# Patient Record
Sex: FEMALE | Race: WHITE | NOT HISPANIC OR LATINO | Employment: OTHER
[De-identification: names, ages, dates, MRNs, and addresses within clinical notes are randomized per-mention and may not be internally consistent; named-entity substitution may affect disease eponyms.]

---

## 2017-01-06 ENCOUNTER — HOSPITAL (OUTPATIENT)
Dept: OTHER | Age: 82
End: 2017-01-06
Attending: INTERNAL MEDICINE

## 2017-01-06 LAB
FREE T3: 2.5 PG/ML (ref 2.2–4)
FREE T4: 1.1 NG/DL (ref 0.8–1.5)
TSH SERPL-ACNC: 2.31 MCUNIT/ML (ref 0.35–5)

## 2017-02-01 ENCOUNTER — HOSPITAL (OUTPATIENT)
Dept: OTHER | Age: 82
End: 2017-02-01
Attending: INTERNAL MEDICINE

## 2017-02-27 RX ORDER — CALCIPOTRIENE 50 UG/G
1 OINTMENT TOPICAL 2 TIMES DAILY
Qty: 900 G | Refills: 1 | Status: SHIPPED
Start: 2017-02-27 | End: 2018-04-21

## 2017-02-28 ENCOUNTER — TELEPHONE (OUTPATIENT)
Dept: DERMATOLOGY CLINIC | Facility: CLINIC | Age: 82
End: 2017-02-28

## 2017-03-03 NOTE — TELEPHONE ENCOUNTER
PA for Calcipotriene ointment was denied. Letter states cream and solution are covered. Ok to change? Kindly advise.

## 2017-03-30 ENCOUNTER — TELEPHONE (OUTPATIENT)
Dept: DERMATOLOGY CLINIC | Facility: CLINIC | Age: 82
End: 2017-03-30

## 2017-03-31 RX ORDER — CALCIPOTRIENE 50 UG/G
CREAM TOPICAL
Qty: 900 G | Refills: 3 | Status: SHIPPED | OUTPATIENT
Start: 2017-03-31 | End: 2018-03-21

## 2017-04-13 ENCOUNTER — HOSPITAL (OUTPATIENT)
Dept: OTHER | Age: 82
End: 2017-04-13
Attending: INTERNAL MEDICINE

## 2017-04-13 LAB
FREE T3: 2.4 PG/ML (ref 2.2–4)
FREE T4: 1.2 NG/DL (ref 0.8–1.5)
TSH SERPL-ACNC: 0.92 MCUNIT/ML (ref 0.35–5)

## 2017-04-25 ENCOUNTER — HOSPITAL (OUTPATIENT)
Dept: OTHER | Age: 82
End: 2017-04-25
Attending: SPECIALIST

## 2017-04-25 LAB
ALBUMIN SERPL-MCNC: 3.7 GM/DL (ref 3.6–5.1)
ALBUMIN/GLOB SERPL: 1.1 {RATIO} (ref 1–2.4)
ALP SERPL-CCNC: 112 UNIT/L (ref 45–117)
ALT SERPL-CCNC: 26 UNIT/L
ANION GAP SERPL CALC-SCNC: 12 MMOL/L (ref 10–20)
AST SERPL-CCNC: 19 UNIT/L
BILIRUB SERPL-MCNC: 0.6 MG/DL (ref 0.2–1)
BUN SERPL-MCNC: 17 MG/DL (ref 6–20)
BUN/CREAT SERPL: 21 (ref 7–25)
CALCIUM SERPL-MCNC: 9.2 MG/DL (ref 8.4–10.2)
CHLORIDE: 100 MMOL/L (ref 98–107)
CHOLEST SERPL-MCNC: 125 MG/DL
CHOLEST/HDLC SERPL: 2.2 {RATIO}
CO2 SERPL-SCNC: 25 MMOL/L (ref 21–32)
CREAT SERPL-MCNC: 0.8 MG/DL (ref 0.51–0.95)
FREE T4: 1.2 NG/DL (ref 0.8–1.5)
GLOBULIN SER-MCNC: 3.4 GM/DL (ref 2–4)
GLUCOSE SERPL-MCNC: 108 MG/DL (ref 65–99)
HDLC SERPL-MCNC: 57 MG/DL
LDLC SERPL CALC-MCNC: 53 MG/DL
LENGTH OF FAST TIME PATIENT: ABNORMAL HR
LENGTH OF FAST TIME PATIENT: ABNORMAL HR
NONHDLC SERPL-MCNC: 68 MG/DL
POTASSIUM SERPL-SCNC: 4.2 MMOL/L (ref 3.4–5.1)
PROT SERPL-MCNC: 7.1 GM/DL (ref 6.4–8.2)
SODIUM SERPL-SCNC: 133 MMOL/L (ref 135–145)
TRIGLYCERIDE (TRIGP): 76 MG/DL
TSH SERPL-ACNC: 0.86 MCUNIT/ML (ref 0.35–5)

## 2017-05-04 ENCOUNTER — PRIOR ORIGINAL RECORDS (OUTPATIENT)
Dept: OTHER | Age: 82
End: 2017-05-04

## 2017-05-10 ENCOUNTER — HOSPITAL (OUTPATIENT)
Dept: OTHER | Age: 82
End: 2017-05-10
Attending: SPECIALIST

## 2017-05-19 ENCOUNTER — TELEPHONE (OUTPATIENT)
Dept: DERMATOLOGY CLINIC | Facility: CLINIC | Age: 82
End: 2017-05-19

## 2017-06-06 NOTE — TELEPHONE ENCOUNTER
LMTCB. Please follow up about if pt would still like forms filled out. Also, please schedule pt for skin check appointment.  LOV 6/2016

## 2017-06-06 NOTE — TELEPHONE ENCOUNTER
Form scanned previously dated 7-1-2015 in scanning. I have not seen any paperwork comes through for this. If necessary reprint 7/20/2015 form/add updates, re-sign and fax if needed. . Patient due for her skin check in the next month or 2
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
## 2017-06-22 ENCOUNTER — TELEPHONE (OUTPATIENT)
Dept: DERMATOLOGY CLINIC | Facility: CLINIC | Age: 82
End: 2017-06-22

## 2017-08-11 ENCOUNTER — HOSPITAL (OUTPATIENT)
Dept: OTHER | Age: 82
End: 2017-08-11
Attending: INTERNAL MEDICINE

## 2017-08-11 LAB
FREE T3: 2.7 PG/ML (ref 2.2–4)
FREE T4: 1 NG/DL (ref 0.8–1.5)
TSH SERPL-ACNC: 0.81 MCUNIT/ML (ref 0.35–5)

## 2017-09-12 ENCOUNTER — HOSPITAL (OUTPATIENT)
Dept: OTHER | Age: 82
End: 2017-09-12
Attending: INTERNAL MEDICINE

## 2017-09-12 LAB
FREE T3: 2.7 PG/ML (ref 2.2–4)
FREE T4: 1.1 NG/DL (ref 0.8–1.5)
TSH SERPL-ACNC: 0.85 MCUNIT/ML (ref 0.35–5)

## 2017-10-30 ENCOUNTER — PRIOR ORIGINAL RECORDS (OUTPATIENT)
Dept: OTHER | Age: 82
End: 2017-10-30

## 2017-12-13 ENCOUNTER — HOSPITAL (OUTPATIENT)
Dept: OTHER | Age: 82
End: 2017-12-13
Attending: INTERNAL MEDICINE

## 2017-12-13 LAB
FREE T3: 2.8 PG/ML (ref 2.2–4)
FREE T4: 1.2 NG/DL (ref 0.8–1.5)
TSH SERPL-ACNC: 0.7 MCUNIT/ML (ref 0.35–5)

## 2018-02-19 ENCOUNTER — HOSPITAL (OUTPATIENT)
Dept: OTHER | Age: 83
End: 2018-02-19
Attending: SPECIALIST

## 2018-02-19 LAB
ALBUMIN SERPL-MCNC: 3.8 GM/DL (ref 3.6–5.1)
ALBUMIN/GLOB SERPL: 1 {RATIO} (ref 1–2.4)
ALP SERPL-CCNC: 115 UNIT/L (ref 45–117)
ALT SERPL-CCNC: 31 UNIT/L
ANALYZER ANC (IANC): ABNORMAL THOUSAND/MCL (ref 140–450)
ANION GAP SERPL CALC-SCNC: 12 MMOL/L (ref 10–20)
AST SERPL-CCNC: 21 UNIT/L
BILIRUB SERPL-MCNC: 0.6 MG/DL (ref 0.2–1)
BUN SERPL-MCNC: 16 MG/DL (ref 6–20)
BUN/CREAT SERPL: 25 (ref 7–25)
CALCIUM SERPL-MCNC: 9.7 MG/DL (ref 8.4–10.2)
CHLORIDE: 99 MMOL/L (ref 98–107)
CHOLEST SERPL-MCNC: 176 MG/DL
CHOLEST/HDLC SERPL: 2.9 {RATIO}
CO2 SERPL-SCNC: 28 MMOL/L (ref 21–32)
CREAT SERPL-MCNC: 0.64 MG/DL (ref 0.51–0.95)
ERYTHROCYTE [DISTWIDTH] IN BLOOD: 13.2 % (ref 11–15)
FREE T4: 1 NG/DL (ref 0.8–1.5)
GLOBULIN SER-MCNC: 3.8 GM/DL (ref 2–4)
GLUCOSE SERPL-MCNC: 114 MG/DL (ref 65–99)
HDLC SERPL-MCNC: 61 MG/DL
HEMATOCRIT: 40 % (ref 36–46.5)
HGB BLD-MCNC: 13.7 GM/DL (ref 12–15.5)
LDLC SERPL CALC-MCNC: 95 MG/DL
LENGTH OF FAST TIME PATIENT: ABNORMAL HR
LENGTH OF FAST TIME PATIENT: NORMAL HR
MAGNESIUM SERPL-MCNC: 1.8 MG/DL (ref 1.7–2.4)
MCH RBC QN AUTO: 30.6 PG (ref 26–34)
MCHC RBC AUTO-ENTMCNC: 34.3 GM/DL (ref 32–36.5)
MCV RBC AUTO: 89.5 FL (ref 78–100)
NONHDLC SERPL-MCNC: 115 MG/DL
PLATELET # BLD: 111 THOUSAND/MCL (ref 140–450)
POTASSIUM SERPL-SCNC: 4 MMOL/L (ref 3.4–5.1)
PROT SERPL-MCNC: 7.6 GM/DL (ref 6.4–8.2)
RBC # BLD: 4.47 MILLION/MCL (ref 4–5.2)
SODIUM SERPL-SCNC: 135 MMOL/L (ref 135–145)
TRIGLYCERIDE (TRIGP): 100 MG/DL
TSH SERPL-ACNC: 1.52 MCUNIT/ML (ref 0.35–5)
WBC # BLD: 7 THOUSAND/MCL (ref 4.2–11)

## 2018-02-28 ENCOUNTER — HOSPITAL (OUTPATIENT)
Dept: OTHER | Age: 83
End: 2018-02-28
Attending: INTERNAL MEDICINE

## 2018-02-28 LAB
FREE T3: 2.6 PG/ML (ref 2.2–4)
FREE T4: 1.1 NG/DL (ref 0.8–1.5)
TSH SERPL-ACNC: 1.35 MCUNIT/ML (ref 0.35–5)

## 2018-03-21 RX ORDER — CALCIPOTRIENE 50 UG/G
CREAM TOPICAL
Qty: 900 G | Refills: 1 | Status: SHIPPED | OUTPATIENT
Start: 2018-03-21

## 2018-04-23 RX ORDER — CALCIPOTRIENE 50 UG/G
OINTMENT TOPICAL
Qty: 300 G | Refills: 0 | Status: SHIPPED | OUTPATIENT
Start: 2018-04-23

## 2018-05-01 ENCOUNTER — HOSPITAL (OUTPATIENT)
Dept: OTHER | Age: 83
End: 2018-05-01
Attending: INTERNAL MEDICINE

## 2018-05-01 LAB
FREE T3: 2.9 PG/ML (ref 2.2–4)
FREE T4: 1.1 NG/DL (ref 0.8–1.5)
TSH SERPL-ACNC: 1.3 MCUNIT/ML (ref 0.35–5)

## 2018-05-09 ENCOUNTER — MYAURORA ACCOUNT LINK (OUTPATIENT)
Dept: OTHER | Age: 83
End: 2018-05-09

## 2018-05-09 ENCOUNTER — PRIOR ORIGINAL RECORDS (OUTPATIENT)
Dept: OTHER | Age: 83
End: 2018-05-09

## 2018-05-18 ENCOUNTER — OFFICE VISIT (OUTPATIENT)
Dept: DERMATOLOGY CLINIC | Facility: CLINIC | Age: 83
End: 2018-05-18

## 2018-05-18 DIAGNOSIS — L40.8 OTHER PSORIASIS: Primary | ICD-10-CM

## 2018-05-18 DIAGNOSIS — D23.9 BENIGN NEOPLASM OF SKIN, UNSPECIFIED LOCATION: ICD-10-CM

## 2018-05-18 DIAGNOSIS — L82.1 OTHER SEBORRHEIC KERATOSIS: ICD-10-CM

## 2018-05-18 PROCEDURE — 99212 OFFICE O/P EST SF 10 MIN: CPT | Performed by: DERMATOLOGY

## 2018-05-18 PROCEDURE — G0463 HOSPITAL OUTPT CLINIC VISIT: HCPCS | Performed by: DERMATOLOGY

## 2018-05-18 PROCEDURE — 99213 OFFICE O/P EST LOW 20 MIN: CPT | Performed by: DERMATOLOGY

## 2018-05-18 RX ORDER — LOSARTAN POTASSIUM AND HYDROCHLOROTHIAZIDE 12.5; 1 MG/1; MG/1
TABLET ORAL
Refills: 1 | COMMUNITY
Start: 2018-03-06

## 2018-05-28 NOTE — PROGRESS NOTES
Shanelle Pace is a 81year old female. HPI:     CC:  Patient presents with:  Psoriasis: LOV: 6-3-16.  Pt presents today for f/u pt notes right now its at [facility] currently using Calcitrene cream that pt notes is appyling twice daily that pt Multiple Vitamins-Minerals (PRESERVISION AREDS 2) Oral Cap Take  by mouth. Disp:  Rfl:    carvedilol (COREG) 6.25 MG Oral Tab  Disp:  Rfl:    Meclizine HCl (ANTIVERT) 25 MG Oral Tab Take 25 mg by mouth as needed.  Disp:  Rfl: 0   Ascorbic Acid (VITAMIN C) children: N/A     Occupational History  None on file     Social History Main Topics   Smoking status: Never Smoker    Smokeless tobacco: Never Used    Alcohol use No    Drug use: No    Sexual activity: Not on file     Other Topics Concern    Pt has a pacem Refills for this Visit:    No prescriptions requested or ordered in this encounter         Other psoriasis  (primary encounter diagnosis)  Other seborrheic keratosis  Benign neoplasm of skin, unspecified location    Benign keratoses  c forehead temples ysabel

## 2018-06-21 ENCOUNTER — HOSPITAL (OUTPATIENT)
Dept: OTHER | Age: 83
End: 2018-06-21
Attending: INTERNAL MEDICINE

## 2018-06-21 LAB
FREE T3: 2.8 PG/ML (ref 2.2–4)
FREE T4: 1.1 NG/DL (ref 0.8–1.5)
TSH SERPL-ACNC: 0.51 MCUNIT/ML (ref 0.35–5)

## 2018-08-31 ENCOUNTER — HOSPITAL (OUTPATIENT)
Dept: OTHER | Age: 83
End: 2018-08-31
Attending: SPECIALIST

## 2018-11-01 ENCOUNTER — MYAURORA ACCOUNT LINK (OUTPATIENT)
Dept: OTHER | Age: 83
End: 2018-11-01

## 2018-11-01 ENCOUNTER — PRIOR ORIGINAL RECORDS (OUTPATIENT)
Dept: OTHER | Age: 83
End: 2018-11-01

## 2018-12-11 ENCOUNTER — MYAURORA ACCOUNT LINK (OUTPATIENT)
Dept: OTHER | Age: 83
End: 2018-12-11

## 2019-02-11 ENCOUNTER — HOSPITAL (OUTPATIENT)
Dept: OTHER | Age: 84
End: 2019-02-11
Attending: INTERNAL MEDICINE

## 2019-02-11 ENCOUNTER — HOSPITAL (OUTPATIENT)
Dept: OTHER | Age: 84
End: 2019-02-11

## 2019-02-11 ENCOUNTER — DIAGNOSTIC TRANS (OUTPATIENT)
Dept: OTHER | Age: 84
End: 2019-02-11

## 2019-02-11 LAB
ALBUMIN SERPL-MCNC: 3.4 GM/DL (ref 3.6–5.1)
ALP SERPL-CCNC: 83 UNIT/L (ref 45–117)
ALT SERPL-CCNC: 45 UNIT/L
ANALYZER ANC (IANC): ABNORMAL
ANION GAP SERPL CALC-SCNC: 14 MMOL/L (ref 10–20)
APTT PPP: 26 SECONDS (ref 22–32)
APTT PPP: NORMAL S
AST SERPL-CCNC: 37 UNIT/L
BASOPHILS # BLD: 0.1 THOUSAND/MCL (ref 0–0.3)
BASOPHILS NFR BLD: 1 %
BILIRUB CONJ SERPL-MCNC: 0.2 MG/DL (ref 0–0.2)
BILIRUB SERPL-MCNC: 0.8 MG/DL (ref 0.2–1)
BUN SERPL-MCNC: 19 MG/DL (ref 6–20)
BUN/CREAT SERPL: 23 (ref 7–25)
CALCIUM SERPL-MCNC: 9.4 MG/DL (ref 8.4–10.2)
CHLORIDE: 98 MMOL/L (ref 98–107)
CO2 SERPL-SCNC: 23 MMOL/L (ref 21–32)
CREAT SERPL-MCNC: 0.84 MG/DL (ref 0.51–0.95)
D DIMER PPP FEU-MCNC: 0.96 MG/L FEU
DIFFERENTIAL METHOD BLD: ABNORMAL
EOSINOPHIL # BLD: 0.3 THOUSAND/MCL (ref 0.1–0.5)
EOSINOPHIL NFR BLD: 4 %
ERYTHROCYTE [DISTWIDTH] IN BLOOD: 13.4 % (ref 11–15)
GLUCOSE BLDC GLUCOMTR-MCNC: 146 MG/DL (ref 65–99)
GLUCOSE SERPL-MCNC: 122 MG/DL (ref 65–99)
HEMATOCRIT: 36.6 % (ref 36–46.5)
HGB BLD-MCNC: 12.5 GM/DL (ref 12–15.5)
INR PPP: 1.2
LYMPHOCYTES # BLD: 1.8 THOUSAND/MCL (ref 1–4)
LYMPHOCYTES NFR BLD: 24 %
MCH RBC QN AUTO: 30 PG (ref 26–34)
MCHC RBC AUTO-ENTMCNC: 34.2 GM/DL (ref 32–36.5)
MCV RBC AUTO: 87.8 FL (ref 78–100)
MONOCYTES # BLD: 0.3 THOUSAND/MCL (ref 0.3–0.9)
MONOCYTES NFR BLD: 4 %
NEUTROPHILS # BLD: 5 THOUSAND/MCL (ref 1.8–7.7)
NEUTS SEG NFR BLD: 67 %
NRBC (NRBCRE): 0 /100 WBC
NT-PROBNP SERPL-MCNC: 6736 PG/ML
OVALOCYTES (OVALO): ABNORMAL
PATH REV BLD -IMP: ABNORMAL
PLAT MORPH BLD: NORMAL
PLATELET # BLD: 81 THOUSAND/MCL (ref 140–450)
POTASSIUM SERPL-SCNC: 4 MMOL/L (ref 3.4–5.1)
PROT SERPL-MCNC: 6.3 GM/DL (ref 6.4–8.2)
PROTHROMBIN TIME: 12.1 SECONDS (ref 9.7–11.8)
PROTHROMBIN TIME: ABNORMAL
RBC # BLD: 4.17 MILLION/MCL (ref 4–5.2)
SODIUM SERPL-SCNC: 131 MMOL/L (ref 135–145)
TROPONIN I SERPL HS-MCNC: 0.03 NG/ML
TSH SERPL-ACNC: 0.96 MCUNIT/ML (ref 0.35–5)
WBC # BLD: 7.4 THOUSAND/MCL (ref 4.2–11)
WBC MORPH BLD: NORMAL

## 2019-02-12 ENCOUNTER — HOSPITAL (OUTPATIENT)
Dept: OTHER | Age: 84
End: 2019-02-12

## 2019-02-12 LAB
ANALYZER ANC (IANC): ABNORMAL
ANION GAP SERPL CALC-SCNC: 12 MMOL/L (ref 10–20)
BASOPHILS # BLD: 0.1 THOUSAND/MCL (ref 0–0.3)
BASOPHILS NFR BLD: 1 %
BUN SERPL-MCNC: 22 MG/DL (ref 6–20)
BUN/CREAT SERPL: 28 (ref 7–25)
CALCIUM SERPL-MCNC: 9.5 MG/DL (ref 8.4–10.2)
CHLORIDE: 99 MMOL/L (ref 98–107)
CHOLEST SERPL-MCNC: 143 MG/DL
CHOLEST/HDLC SERPL: 2.8 {RATIO}
CO2 SERPL-SCNC: 26 MMOL/L (ref 21–32)
CREAT SERPL-MCNC: 0.8 MG/DL (ref 0.51–0.95)
DIFFERENTIAL METHOD BLD: ABNORMAL
EOSINOPHIL # BLD: 0.1 THOUSAND/MCL (ref 0.1–0.5)
EOSINOPHIL NFR BLD: 1 %
ERYTHROCYTE [DISTWIDTH] IN BLOOD: 13.4 % (ref 11–15)
GLUCOSE BLDC GLUCOMTR-MCNC: 111 MG/DL (ref 65–99)
GLUCOSE BLDC GLUCOMTR-MCNC: 113 MG/DL (ref 65–99)
GLUCOSE SERPL-MCNC: 105 MG/DL (ref 65–99)
HDLC SERPL-MCNC: 52 MG/DL
HEMATOCRIT: 35.1 % (ref 36–46.5)
HGB BLD-MCNC: 12 GM/DL (ref 12–15.5)
LDLC SERPL CALC-MCNC: 81 MG/DL
LYMPHOCYTES # BLD: 2.2 THOUSAND/MCL (ref 1–4)
LYMPHOCYTES NFR BLD: 22 %
MAGNESIUM SERPL-MCNC: 1.4 MG/DL (ref 1.7–2.4)
MAGNESIUM SERPL-MCNC: 1.7 MG/DL (ref 1.7–2.4)
MCH RBC QN AUTO: 29.8 PG (ref 26–34)
MCHC RBC AUTO-ENTMCNC: 34.2 GM/DL (ref 32–36.5)
MCV RBC AUTO: 87.1 FL (ref 78–100)
MONOCYTES # BLD: 0.7 THOUSAND/MCL (ref 0.3–0.9)
MONOCYTES NFR BLD: 9 %
NEUTROPHILS # BLD: 4.9 THOUSAND/MCL (ref 1.8–7.7)
NEUTS SEG NFR BLD: 61 %
NONHDLC SERPL-MCNC: 91 MG/DL
NRBC (NRBCRE): 0 /100 WBC
PATH REV BLD -IMP: ABNORMAL
PATHOLOGIST NAME: NORMAL
PLATELET # BLD: 91 THOUSAND/MCL (ref 140–450)
POTASSIUM SERPL-SCNC: 3.1 MMOL/L (ref 3.4–5.1)
POTASSIUM SERPL-SCNC: 3.5 MMOL/L (ref 3.4–5.1)
RBC # BLD: 4.03 MILLION/MCL (ref 4–5.2)
SMEAR/PATHOLOGY EVALUATION: NORMAL
SODIUM SERPL-SCNC: 134 MMOL/L (ref 135–145)
TRIGLYCERIDE (TRIGP): 50 MG/DL
VARIANT LYMPHS NFR BLD: 6 % (ref 0–5)
WBC # BLD: 8 THOUSAND/MCL (ref 4.2–11)

## 2019-02-13 LAB
%HEPARIN INHIBITED (PF4INH): NORMAL
ANALYZER ANC (IANC): ABNORMAL
ANION GAP SERPL CALC-SCNC: 13 MMOL/L (ref 10–20)
BASOPHILS # BLD: 0 THOUSAND/MCL (ref 0–0.3)
BASOPHILS NFR BLD: 0 %
BUN SERPL-MCNC: 23 MG/DL (ref 6–20)
BUN/CREAT SERPL: 26 (ref 7–25)
CALCIUM SERPL-MCNC: 9 MG/DL (ref 8.4–10.2)
CHLORIDE: 97 MMOL/L (ref 98–107)
CLOSURE TME BLD-IMP: NORMAL
CLOSURE TME COLL+EPINEP BLD: 122 SECONDS (ref 70–160)
CO2 SERPL-SCNC: 25 MMOL/L (ref 21–32)
CREAT SERPL-MCNC: 0.9 MG/DL (ref 0.51–0.95)
DIFFERENTIAL METHOD BLD: ABNORMAL
EOSINOPHIL # BLD: 0.4 THOUSAND/MCL (ref 0.1–0.5)
EOSINOPHIL NFR BLD: 6 %
ERYTHROCYTE [DISTWIDTH] IN BLOOD: 13.2 % (ref 11–15)
GLUCOSE BLDC GLUCOMTR-MCNC: 132 MG/DL (ref 65–99)
GLUCOSE SERPL-MCNC: 102 MG/DL (ref 65–99)
HEMATOCRIT: 34.6 % (ref 36–46.5)
HGB BLD-MCNC: 11.9 GM/DL (ref 12–15.5)
IGG INTERPRETATION (PF4GI): NORMAL
LYMPHOCYTES # BLD: 2.6 THOUSAND/MCL (ref 1–4)
LYMPHOCYTES NFR BLD: 40 %
MAGNESIUM SERPL-MCNC: 1.9 MG/DL (ref 1.7–2.4)
MCH RBC QN AUTO: 30.1 PG (ref 26–34)
MCHC RBC AUTO-ENTMCNC: 34.4 GM/DL (ref 32–36.5)
MCV RBC AUTO: 87.4 FL (ref 78–100)
MONOCYTES # BLD: 0.2 THOUSAND/MCL (ref 0.3–0.9)
MONOCYTES NFR BLD: 3 %
NEUTROPHILS # BLD: 3 THOUSAND/MCL (ref 1.8–7.7)
NEUTS SEG NFR BLD: 49 %
NRBC (NRBCRE): 0 /100 WBC
PATH REV BLD -IMP: ABNORMAL
PF4 IGG OPTICAL DENSITY (PF4GOD): 0.07
PF4 IGG RESULT (PF4G1): NEGATIVE
PLAT MORPH BLD: NORMAL
PLATELET # BLD: 115 THOUSAND/MCL (ref 140–450)
POLYCHROMASIA (POLY): ABNORMAL
POTASSIUM SERPL-SCNC: 4 MMOL/L (ref 3.4–5.1)
RBC # BLD: 3.96 MILLION/MCL (ref 4–5.2)
SODIUM SERPL-SCNC: 131 MMOL/L (ref 135–145)
VARIANT LYMPHS NFR BLD: 2 % (ref 0–5)
WBC # BLD: 6.1 THOUSAND/MCL (ref 4.2–11)
WBC MORPH BLD: NORMAL

## 2019-02-18 ENCOUNTER — HOSPITAL (OUTPATIENT)
Dept: OTHER | Age: 84
End: 2019-02-18
Attending: EMERGENCY MEDICINE

## 2019-02-18 ENCOUNTER — DIAGNOSTIC TRANS (OUTPATIENT)
Dept: OTHER | Age: 84
End: 2019-02-18

## 2019-02-18 LAB
ANALYZER ANC (IANC): ABNORMAL
ANION GAP SERPL CALC-SCNC: 12 MMOL/L (ref 10–20)
BASOPHILS # BLD: 0 THOUSAND/MCL (ref 0–0.3)
BASOPHILS NFR BLD: 1 %
BUN SERPL-MCNC: 20 MG/DL (ref 6–20)
BUN/CREAT SERPL: 18 (ref 7–25)
CALCIUM SERPL-MCNC: 9.1 MG/DL (ref 8.4–10.2)
CHLORIDE: 95 MMOL/L (ref 98–107)
CO2 SERPL-SCNC: 27 MMOL/L (ref 21–32)
CREAT SERPL-MCNC: 1.1 MG/DL (ref 0.51–0.95)
D DIMER PPP FEU-MCNC: 0.69 MG/L FEU
DIFFERENTIAL METHOD BLD: ABNORMAL
DIGOXIN SERPL-MCNC: 1.6 NG/ML (ref 0.8–2.1)
EOSINOPHIL # BLD: 0.2 THOUSAND/MCL (ref 0.1–0.5)
EOSINOPHIL NFR BLD: 3 %
ERYTHROCYTE [DISTWIDTH] IN BLOOD: 12.9 % (ref 11–15)
GLUCOSE SERPL-MCNC: 124 MG/DL (ref 65–99)
HEMATOCRIT: 35.2 % (ref 36–46.5)
HGB BLD-MCNC: 11.9 GM/DL (ref 12–15.5)
IMM GRANULOCYTES # BLD AUTO: 0 THOUSAND/MCL (ref 0–0.2)
IMM GRANULOCYTES NFR BLD: 0 %
LYMPHOCYTES # BLD: 1.9 THOUSAND/MCL (ref 1–4)
LYMPHOCYTES NFR BLD: 29 %
MCH RBC QN AUTO: 29.8 PG (ref 26–34)
MCHC RBC AUTO-ENTMCNC: 33.8 GM/DL (ref 32–36.5)
MCV RBC AUTO: 88 FL (ref 78–100)
MONOCYTES # BLD: 0.4 THOUSAND/MCL (ref 0.3–0.9)
MONOCYTES NFR BLD: 6 %
NEUTROPHILS # BLD: 3.9 THOUSAND/MCL (ref 1.8–7.7)
NEUTROPHILS NFR BLD: 61 %
NEUTS SEG NFR BLD: ABNORMAL %
NRBC (NRBCRE): 0 /100 WBC
NT-PROBNP SERPL-MCNC: 3886 PG/ML
PLATELET # BLD: 123 THOUSAND/MCL (ref 140–450)
POTASSIUM SERPL-SCNC: 4.8 MMOL/L (ref 3.4–5.1)
RBC # BLD: 4 MILLION/MCL (ref 4–5.2)
SODIUM SERPL-SCNC: 129 MMOL/L (ref 135–145)
TROPONIN I SERPL HS-MCNC: <0.02 NG/ML
WBC # BLD: 6.4 THOUSAND/MCL (ref 4.2–11)

## 2019-02-28 VITALS
DIASTOLIC BLOOD PRESSURE: 105 MMHG | HEIGHT: 62 IN | SYSTOLIC BLOOD PRESSURE: 158 MMHG | BODY MASS INDEX: 29.81 KG/M2 | WEIGHT: 162 LBS | HEART RATE: 66 BPM

## 2019-02-28 VITALS — HEIGHT: 62 IN | HEART RATE: 70 BPM | DIASTOLIC BLOOD PRESSURE: 60 MMHG | SYSTOLIC BLOOD PRESSURE: 134 MMHG

## 2019-02-28 VITALS — SYSTOLIC BLOOD PRESSURE: 142 MMHG | HEART RATE: 66 BPM | DIASTOLIC BLOOD PRESSURE: 70 MMHG | HEIGHT: 62 IN

## 2019-03-01 VITALS
BODY MASS INDEX: 29.44 KG/M2 | WEIGHT: 160 LBS | HEIGHT: 62 IN | SYSTOLIC BLOOD PRESSURE: 124 MMHG | HEART RATE: 68 BPM | DIASTOLIC BLOOD PRESSURE: 64 MMHG

## 2019-03-06 ENCOUNTER — TELEPHONE (OUTPATIENT)
Dept: CARDIOLOGY | Age: 84
End: 2019-03-06

## 2019-03-15 ENCOUNTER — TELEPHONE (OUTPATIENT)
Dept: CARDIOLOGY | Age: 84
End: 2019-03-15

## 2019-04-04 ENCOUNTER — HOSPITAL (OUTPATIENT)
Dept: OTHER | Age: 84
End: 2019-04-04
Attending: EMERGENCY MEDICINE

## 2019-04-12 ENCOUNTER — HOSPITAL (OUTPATIENT)
Dept: OTHER | Age: 84
End: 2019-04-12
Attending: ORTHOPAEDIC SURGERY

## 2019-04-16 ENCOUNTER — HOSPITAL (OUTPATIENT)
Dept: OTHER | Age: 84
End: 2019-04-16
Attending: ORTHOPAEDIC SURGERY

## 2019-04-16 ENCOUNTER — HOSPITAL (OUTPATIENT)
Dept: OTHER | Age: 84
End: 2019-04-16

## 2019-04-18 RX ORDER — CETIRIZINE HYDROCHLORIDE 10 MG/1
TABLET ORAL
COMMUNITY
End: 2019-05-30 | Stop reason: CLARIF

## 2019-04-18 RX ORDER — METHIMAZOLE 5 MG/1
TABLET ORAL
COMMUNITY
End: 2019-05-30 | Stop reason: CLARIF

## 2019-04-18 RX ORDER — MV-MN/OM3/DHA/EPA/FISH/LUT/ZEA 250-5-1 MG
CAPSULE ORAL
COMMUNITY
End: 2019-05-30 | Stop reason: CLARIF

## 2019-04-18 RX ORDER — VITAMIN E 268 MG
CAPSULE ORAL
COMMUNITY
End: 2019-05-30 | Stop reason: CLARIF

## 2019-04-18 RX ORDER — CARVEDILOL 6.25 MG/1
TABLET ORAL
COMMUNITY
End: 2019-05-30 | Stop reason: DRUGHIGH

## 2019-04-18 RX ORDER — MULTIVIT WITH MINERALS/LUTEIN
TABLET ORAL
COMMUNITY
End: 2019-05-30 | Stop reason: CLARIF

## 2019-04-18 RX ORDER — MECLIZINE HYDROCHLORIDE 25 MG/1
TABLET ORAL
COMMUNITY
End: 2019-05-30 | Stop reason: CLARIF

## 2019-04-18 RX ORDER — CALCIUM CARBONATE 500(1250)
TABLET ORAL
COMMUNITY
End: 2019-05-30 | Stop reason: CLARIF

## 2019-04-18 RX ORDER — THIAMINE HCL 100 MG
TABLET ORAL
COMMUNITY
End: 2019-05-30 | Stop reason: CLARIF

## 2019-04-18 RX ORDER — METHIMAZOLE 5 MG/1
TABLET ORAL
COMMUNITY
End: 2019-05-30 | Stop reason: ALTCHOICE

## 2019-04-18 RX ORDER — LOSARTAN POTASSIUM AND HYDROCHLOROTHIAZIDE 25; 100 MG/1; MG/1
TABLET ORAL
COMMUNITY
End: 2019-05-30 | Stop reason: CLARIF

## 2019-05-21 ENCOUNTER — TELEPHONE (OUTPATIENT)
Dept: CARDIOLOGY | Age: 84
End: 2019-05-21

## 2019-05-30 ENCOUNTER — OFFICE VISIT (OUTPATIENT)
Dept: CARDIOLOGY | Age: 84
End: 2019-05-30

## 2019-05-30 ENCOUNTER — HOSPITAL (OUTPATIENT)
Dept: OTHER | Age: 84
End: 2019-05-30
Attending: INTERNAL MEDICINE

## 2019-05-30 ENCOUNTER — LAB SERVICES (OUTPATIENT)
Dept: LAB | Age: 84
End: 2019-05-30

## 2019-05-30 VITALS
HEART RATE: 64 BPM | WEIGHT: 142 LBS | DIASTOLIC BLOOD PRESSURE: 70 MMHG | SYSTOLIC BLOOD PRESSURE: 112 MMHG | BODY MASS INDEX: 25.97 KG/M2

## 2019-05-30 DIAGNOSIS — E78.2 HYPERLIPIDEMIA, MIXED: ICD-10-CM

## 2019-05-30 DIAGNOSIS — Z79.899 ENCOUNTER FOR MONITORING AMIODARONE THERAPY: ICD-10-CM

## 2019-05-30 DIAGNOSIS — Z51.81 ENCOUNTER FOR MONITORING AMIODARONE THERAPY: ICD-10-CM

## 2019-05-30 DIAGNOSIS — Z79.01 LONG TERM (CURRENT) USE OF ANTICOAGULANTS: ICD-10-CM

## 2019-05-30 DIAGNOSIS — I48.0 AF (PAROXYSMAL ATRIAL FIBRILLATION) (CMD): ICD-10-CM

## 2019-05-30 DIAGNOSIS — I47.20 VT (VENTRICULAR TACHYCARDIA) (CMD): Primary | ICD-10-CM

## 2019-05-30 DIAGNOSIS — I42.0 DCM (DILATED CARDIOMYOPATHY) (CMD): ICD-10-CM

## 2019-05-30 DIAGNOSIS — Z95.810 ICD (IMPLANTABLE CARDIOVERTER-DEFIBRILLATOR) IN PLACE: ICD-10-CM

## 2019-05-30 DIAGNOSIS — R06.09 DYSPNEA ON EXERTION: ICD-10-CM

## 2019-05-30 PROBLEM — I50.22 HEART FAILURE, CHRONIC SYSTOLIC (CMD): Status: ACTIVE | Noted: 2019-05-30

## 2019-05-30 PROBLEM — I10 HYPERTENSION: Status: ACTIVE | Noted: 2019-05-30

## 2019-05-30 PROBLEM — E78.00 HYPERCHOLESTEREMIA: Status: ACTIVE | Noted: 2019-05-30

## 2019-05-30 PROBLEM — I44.7 LBBB (LEFT BUNDLE BRANCH BLOCK): Status: ACTIVE | Noted: 2019-05-30

## 2019-05-30 PROBLEM — Z45.02 ICD (IMPLANTABLE CARDIOVERTER-DEFIBRILLATOR) BATTERY DEPLETION: Status: ACTIVE | Noted: 2019-05-30

## 2019-05-30 LAB
ALBUMIN SERPL-MCNC: 4.1 G/DL (ref 3.6–5.1)
ALP SERPL-CCNC: 138 UNITS/L (ref 45–117)
ALT SERPL-CCNC: 32 UNITS/L
AST SERPL-CCNC: 24 UNITS/L
BILIRUB CONJ SERPL-MCNC: 0.3 MG/DL (ref 0–0.2)
BILIRUB SERPL-MCNC: 0.6 MG/DL (ref 0.2–1)
PROT SERPL-MCNC: 6.9 G/DL (ref 6.4–8.2)
T4 FREE SERPL-MCNC: 1.5 NG/DL (ref 0.8–1.5)
TSH SERPL-ACNC: 0.39 MCUNITS/ML (ref 0.35–5)

## 2019-05-30 PROCEDURE — 36415 COLL VENOUS BLD VENIPUNCTURE: CPT | Performed by: INTERNAL MEDICINE

## 2019-05-30 PROCEDURE — 84443 ASSAY THYROID STIM HORMONE: CPT | Performed by: INTERNAL MEDICINE

## 2019-05-30 PROCEDURE — 84439 ASSAY OF FREE THYROXINE: CPT | Performed by: INTERNAL MEDICINE

## 2019-05-30 PROCEDURE — 99214 OFFICE O/P EST MOD 30 MIN: CPT | Performed by: INTERNAL MEDICINE

## 2019-05-30 PROCEDURE — 80076 HEPATIC FUNCTION PANEL: CPT | Performed by: INTERNAL MEDICINE

## 2019-05-30 RX ORDER — ATORVASTATIN CALCIUM 10 MG/1
10 TABLET, FILM COATED ORAL AT BEDTIME
COMMUNITY
End: 2021-01-01

## 2019-05-30 RX ORDER — DILTIAZEM HYDROCHLORIDE 120 MG/1
120 CAPSULE, COATED, EXTENDED RELEASE ORAL DAILY
COMMUNITY
End: 2020-01-03 | Stop reason: ALTCHOICE

## 2019-05-30 RX ORDER — CARVEDILOL 25 MG/1
6.25 TABLET ORAL 2 TIMES DAILY WITH MEALS
COMMUNITY
Start: 2021-01-01 | End: 2021-01-01

## 2019-05-30 RX ORDER — FUROSEMIDE 40 MG/1
40 TABLET ORAL DAILY
Status: ON HOLD | COMMUNITY
End: 2021-01-01 | Stop reason: HOSPADM

## 2019-05-30 RX ORDER — AMIODARONE HYDROCHLORIDE 200 MG/1
200 TABLET ORAL DAILY
COMMUNITY
End: 2020-01-02 | Stop reason: ALTCHOICE

## 2019-05-30 RX ORDER — POTASSIUM CHLORIDE 20 MEQ/1
20 TABLET, EXTENDED RELEASE ORAL DAILY
Status: ON HOLD | COMMUNITY
End: 2021-01-01 | Stop reason: HOSPADM

## 2019-05-30 RX ORDER — DIGOXIN 125 MCG
125 TABLET ORAL DAILY
Status: ON HOLD | COMMUNITY
End: 2021-01-01 | Stop reason: HOSPADM

## 2019-05-30 SDOH — HEALTH STABILITY: PHYSICAL HEALTH: ON AVERAGE, HOW MANY DAYS PER WEEK DO YOU ENGAGE IN MODERATE TO STRENUOUS EXERCISE (LIKE A BRISK WALK)?: 0 DAYS

## 2019-05-30 ASSESSMENT — ENCOUNTER SYMPTOMS
FEVER: 0
WEIGHT GAIN: 0
COUGH: 0
WEIGHT LOSS: 0
SUSPICIOUS LESIONS: 0
LIGHT-HEADEDNESS: 1
BRUISES/BLEEDS EASILY: 0
HEMATOCHEZIA: 0
HEMOPTYSIS: 0
CHILLS: 0
ALLERGIC/IMMUNOLOGIC COMMENTS: NO NEW FOOD ALLERGIES
SHORTNESS OF BREATH: 1

## 2019-05-31 ENCOUNTER — TELEPHONE (OUTPATIENT)
Dept: CARDIOLOGY | Age: 84
End: 2019-05-31

## 2019-06-25 ENCOUNTER — ANCILLARY PROCEDURE (OUTPATIENT)
Dept: CARDIOLOGY | Age: 84
End: 2019-06-25
Attending: INTERNAL MEDICINE

## 2019-06-25 ENCOUNTER — ANCILLARY ORDERS (OUTPATIENT)
Dept: CARDIOLOGY | Age: 84
End: 2019-06-25

## 2019-06-25 DIAGNOSIS — Z95.810 ICD (IMPLANTABLE CARDIOVERTER-DEFIBRILLATOR) IN PLACE: ICD-10-CM

## 2019-06-25 PROCEDURE — 93295 DEV INTERROG REMOTE 1/2/MLT: CPT | Performed by: INTERNAL MEDICINE

## 2019-06-25 PROCEDURE — 93296 REM INTERROG EVL PM/IDS: CPT | Performed by: INTERNAL MEDICINE

## 2019-10-01 ENCOUNTER — TELEPHONE (OUTPATIENT)
Dept: CARDIOLOGY | Age: 84
End: 2019-10-01

## 2019-10-01 ENCOUNTER — ANCILLARY PROCEDURE (OUTPATIENT)
Dept: CARDIOLOGY | Age: 84
End: 2019-10-01
Attending: INTERNAL MEDICINE

## 2019-10-01 ENCOUNTER — ANCILLARY ORDERS (OUTPATIENT)
Dept: CARDIOLOGY | Age: 84
End: 2019-10-01

## 2019-10-01 DIAGNOSIS — Z95.810 ICD (IMPLANTABLE CARDIOVERTER-DEFIBRILLATOR) IN PLACE: ICD-10-CM

## 2019-10-01 PROCEDURE — X1114 CARDIAC DEVICE HOME CHECK - REMOTE UNSCHEDULED: HCPCS | Performed by: INTERNAL MEDICINE

## 2019-10-01 PROCEDURE — 93296 REM INTERROG EVL PM/IDS: CPT | Performed by: INTERNAL MEDICINE

## 2019-10-01 PROCEDURE — 93295 DEV INTERROG REMOTE 1/2/MLT: CPT | Performed by: INTERNAL MEDICINE

## 2019-11-13 ENCOUNTER — APPOINTMENT (OUTPATIENT)
Dept: CARDIOLOGY | Age: 84
End: 2019-11-13
Attending: INTERNAL MEDICINE

## 2019-11-14 ENCOUNTER — APPOINTMENT (OUTPATIENT)
Dept: CARDIOLOGY | Age: 84
End: 2019-11-14

## 2019-11-18 ENCOUNTER — APPOINTMENT (OUTPATIENT)
Dept: CARDIOLOGY | Age: 84
End: 2019-11-18
Attending: INTERNAL MEDICINE

## 2019-12-05 ENCOUNTER — ANCILLARY PROCEDURE (OUTPATIENT)
Dept: CARDIOLOGY | Age: 84
End: 2019-12-05
Attending: INTERNAL MEDICINE

## 2019-12-05 VITALS
BODY MASS INDEX: 26.83 KG/M2 | SYSTOLIC BLOOD PRESSURE: 116 MMHG | HEIGHT: 61 IN | HEART RATE: 65 BPM | DIASTOLIC BLOOD PRESSURE: 52 MMHG

## 2019-12-05 DIAGNOSIS — Z95.810 ICD (IMPLANTABLE CARDIOVERTER-DEFIBRILLATOR) IN PLACE: Primary | ICD-10-CM

## 2019-12-05 DIAGNOSIS — Z45.02 IMPLANTABLE DEFIBRILLATOR REPROGRAMMING/CHECK: ICD-10-CM

## 2019-12-05 PROCEDURE — 93290 INTERROG DEV EVAL ICPMS IP: CPT | Performed by: INTERNAL MEDICINE

## 2019-12-05 PROCEDURE — 93283 PRGRMG EVAL IMPLANTABLE DFB: CPT | Performed by: INTERNAL MEDICINE

## 2019-12-11 PROCEDURE — 93295 DEV INTERROG REMOTE 1/2/MLT: CPT | Performed by: INTERNAL MEDICINE

## 2019-12-16 RX ORDER — MULTIVIT WITH MINERALS/LUTEIN
1000 TABLET ORAL DAILY
COMMUNITY
End: 2020-01-03 | Stop reason: ALTCHOICE

## 2019-12-16 RX ORDER — ANTIOX #8/OM3/DHA/EPA/LUT/ZEAX 250-2.5 MG
1 CAPSULE ORAL DAILY
COMMUNITY
End: 2020-01-03 | Stop reason: ALTCHOICE

## 2019-12-16 RX ORDER — LOSARTAN POTASSIUM AND HYDROCHLOROTHIAZIDE 12.5; 1 MG/1; MG/1
1 TABLET ORAL DAILY
COMMUNITY
Start: 2018-03-06 | End: 2020-01-02 | Stop reason: ALTCHOICE

## 2019-12-16 RX ORDER — MAGNESIUM OXIDE/MAG AA CHELATE 300 MG
300 CAPSULE ORAL DAILY
COMMUNITY
Start: 2010-12-17 | End: 2020-01-03 | Stop reason: ALTCHOICE

## 2019-12-16 RX ORDER — METHIMAZOLE 5 MG/1
5 TABLET ORAL DAILY
COMMUNITY
Start: 2014-03-18 | End: 2020-01-03 | Stop reason: ALTCHOICE

## 2019-12-30 ENCOUNTER — APPOINTMENT (OUTPATIENT)
Dept: CARDIOLOGY | Age: 84
End: 2019-12-30

## 2020-01-01 ENCOUNTER — ANCILLARY PROCEDURE (OUTPATIENT)
Dept: CARDIOLOGY | Age: 85
End: 2020-01-01
Attending: INTERNAL MEDICINE

## 2020-01-01 ENCOUNTER — TELEPHONE (OUTPATIENT)
Dept: CARDIOLOGY | Age: 85
End: 2020-01-01

## 2020-01-01 ENCOUNTER — ANCILLARY ORDERS (OUTPATIENT)
Dept: CARDIOLOGY | Age: 85
End: 2020-01-01

## 2020-01-01 DIAGNOSIS — Z95.810 ICD (IMPLANTABLE CARDIOVERTER-DEFIBRILLATOR) IN PLACE: ICD-10-CM

## 2020-01-01 PROCEDURE — X1114 CARDIAC DEVICE HOME CHECK - REMOTE UNSCHEDULED: HCPCS | Performed by: INTERNAL MEDICINE

## 2020-01-13 ENCOUNTER — OFFICE VISIT (OUTPATIENT)
Dept: CARDIOLOGY | Age: 85
End: 2020-01-13

## 2020-01-13 VITALS
DIASTOLIC BLOOD PRESSURE: 60 MMHG | SYSTOLIC BLOOD PRESSURE: 128 MMHG | HEART RATE: 76 BPM | BODY MASS INDEX: 23.43 KG/M2 | WEIGHT: 124 LBS

## 2020-01-13 DIAGNOSIS — I42.0 DCM (DILATED CARDIOMYOPATHY) (CMD): ICD-10-CM

## 2020-01-13 DIAGNOSIS — I50.22 HEART FAILURE, CHRONIC SYSTOLIC (CMD): ICD-10-CM

## 2020-01-13 DIAGNOSIS — Z45.02 ICD (IMPLANTABLE CARDIOVERTER-DEFIBRILLATOR) BATTERY DEPLETION: ICD-10-CM

## 2020-01-13 DIAGNOSIS — I48.20 ATRIAL FIBRILLATION, CHRONIC (CMD): ICD-10-CM

## 2020-01-13 DIAGNOSIS — Z79.01 LONG TERM (CURRENT) USE OF ANTICOAGULANTS: ICD-10-CM

## 2020-01-13 DIAGNOSIS — Z95.810 ICD (IMPLANTABLE CARDIOVERTER-DEFIBRILLATOR) IN PLACE: Primary | ICD-10-CM

## 2020-01-13 DIAGNOSIS — I44.7 LBBB (LEFT BUNDLE BRANCH BLOCK): ICD-10-CM

## 2020-01-13 DIAGNOSIS — E78.2 HYPERLIPIDEMIA, MIXED: ICD-10-CM

## 2020-01-13 PROCEDURE — 3078F DIAST BP <80 MM HG: CPT | Performed by: INTERNAL MEDICINE

## 2020-01-13 PROCEDURE — 3074F SYST BP LT 130 MM HG: CPT | Performed by: INTERNAL MEDICINE

## 2020-01-13 PROCEDURE — 99214 OFFICE O/P EST MOD 30 MIN: CPT | Performed by: INTERNAL MEDICINE

## 2020-01-13 SDOH — HEALTH STABILITY: PHYSICAL HEALTH: ON AVERAGE, HOW MANY MINUTES DO YOU ENGAGE IN EXERCISE AT THIS LEVEL?: 0 MIN

## 2020-01-13 SDOH — HEALTH STABILITY: PHYSICAL HEALTH: ON AVERAGE, HOW MANY DAYS PER WEEK DO YOU ENGAGE IN MODERATE TO STRENUOUS EXERCISE (LIKE A BRISK WALK)?: 0 DAYS

## 2020-01-13 ASSESSMENT — ENCOUNTER SYMPTOMS
HEMOPTYSIS: 0
FEVER: 0
BRUISES/BLEEDS EASILY: 0
ALLERGIC/IMMUNOLOGIC COMMENTS: NO NEW FOOD ALLERGIES
HEMATOCHEZIA: 0
WEIGHT LOSS: 0
COUGH: 0
CHILLS: 0
SUSPICIOUS LESIONS: 0
LIGHT-HEADEDNESS: 1
LOSS OF BALANCE: 1
WEIGHT GAIN: 0

## 2020-02-21 ENCOUNTER — HOSPITAL ENCOUNTER (EMERGENCY)
Age: 85
Discharge: HOME | End: 2020-02-21
Attending: EMERGENCY MEDICINE

## 2020-02-21 ENCOUNTER — APPOINTMENT (OUTPATIENT)
Dept: ULTRASOUND IMAGING | Age: 85
End: 2020-02-21
Attending: EMERGENCY MEDICINE

## 2020-02-21 VITALS
SYSTOLIC BLOOD PRESSURE: 126 MMHG | DIASTOLIC BLOOD PRESSURE: 72 MMHG | OXYGEN SATURATION: 99 % | WEIGHT: 123.46 LBS | HEART RATE: 65 BPM | BODY MASS INDEX: 23.33 KG/M2 | RESPIRATION RATE: 12 BRPM | TEMPERATURE: 97.7 F

## 2020-02-21 DIAGNOSIS — I89.0 LYMPHEDEMA: Primary | ICD-10-CM

## 2020-02-21 PROCEDURE — 93971 EXTREMITY STUDY: CPT

## 2020-02-21 PROCEDURE — 99283 EMERGENCY DEPT VISIT LOW MDM: CPT

## 2020-02-21 ASSESSMENT — PAIN SCALES - GENERAL: PAINLEVEL_OUTOF10: 0

## 2020-04-29 ENCOUNTER — APPOINTMENT (OUTPATIENT)
Dept: GENERAL RADIOLOGY | Age: 85
DRG: 554 | End: 2020-04-29
Attending: EMERGENCY MEDICINE

## 2020-04-29 ENCOUNTER — APPOINTMENT (OUTPATIENT)
Dept: ULTRASOUND IMAGING | Age: 85
DRG: 554 | End: 2020-04-29
Attending: EMERGENCY MEDICINE

## 2020-04-29 ENCOUNTER — HOSPITAL ENCOUNTER (INPATIENT)
Age: 85
LOS: 2 days | Discharge: HOME HEALTH | DRG: 554 | End: 2020-05-01
Attending: EMERGENCY MEDICINE | Admitting: INTERNAL MEDICINE

## 2020-04-29 DIAGNOSIS — I42.0 DCM (DILATED CARDIOMYOPATHY) (CMD): ICD-10-CM

## 2020-04-29 DIAGNOSIS — M25.50 PAIN IN JOINT, MULTIPLE SITES: Primary | ICD-10-CM

## 2020-04-29 DIAGNOSIS — M11.20 CHONDROCALCINOSIS: ICD-10-CM

## 2020-04-29 LAB
ANION GAP SERPL CALC-SCNC: 10 MMOL/L (ref 10–20)
BASOPHILS # BLD: 0 K/MCL (ref 0–0.3)
BASOPHILS NFR BLD: 0 %
BUN SERPL-MCNC: 25 MG/DL (ref 6–20)
BUN/CREAT SERPL: 32 (ref 7–25)
CALCIUM SERPL-MCNC: 9.4 MG/DL (ref 8.4–10.2)
CHLORIDE SERPL-SCNC: 101 MMOL/L (ref 98–107)
CK SERPL-CCNC: 38 UNITS/L (ref 26–192)
CO2 SERPL-SCNC: 28 MMOL/L (ref 21–32)
CREAT SERPL-MCNC: 0.79 MG/DL (ref 0.51–0.95)
CRP SERPL-MCNC: 8.4 MG/DL
DIFFERENTIAL METHOD BLD: ABNORMAL
EOSINOPHIL # BLD: 0.1 K/MCL (ref 0.1–0.5)
EOSINOPHIL NFR BLD: 1 %
ERYTHROCYTE [DISTWIDTH] IN BLOOD: 14.5 % (ref 11–15)
ERYTHROCYTE [SEDIMENTATION RATE] IN BLOOD BY WESTERGREN METHOD: 47 MM/HR (ref 0–20)
GLUCOSE SERPL-MCNC: 146 MG/DL (ref 65–99)
HCT VFR BLD CALC: 37.3 % (ref 36–46.5)
HGB BLD-MCNC: 12.2 G/DL (ref 12–15.5)
IMM GRANULOCYTES # BLD AUTO: 0.1 K/MCL (ref 0–0.2)
IMM GRANULOCYTES NFR BLD: 1 %
LYMPHOCYTES # BLD: 0.6 K/MCL (ref 1–4)
LYMPHOCYTES NFR BLD: 6 %
MAGNESIUM SERPL-MCNC: 2.1 MG/DL (ref 1.7–2.4)
MCH RBC QN AUTO: 27.1 PG (ref 26–34)
MCHC RBC AUTO-ENTMCNC: 32.7 G/DL (ref 32–36.5)
MCV RBC AUTO: 82.7 FL (ref 78–100)
MONOCYTES # BLD: 1.4 K/MCL (ref 0.3–0.9)
MONOCYTES NFR BLD: 15 %
NEUTROPHILS # BLD: 7.2 K/MCL (ref 1.8–7.7)
NEUTROPHILS NFR BLD: 77 %
NRBC BLD MANUAL-RTO: 0 /100 WBC
PLATELET # BLD: 165 K/MCL (ref 140–450)
POTASSIUM SERPL-SCNC: 3.8 MMOL/L (ref 3.4–5.1)
RBC # BLD: 4.51 MIL/MCL (ref 4–5.2)
SODIUM SERPL-SCNC: 135 MMOL/L (ref 135–145)
WBC # BLD: 9.4 K/MCL (ref 4.2–11)

## 2020-04-29 PROCEDURE — 36415 COLL VENOUS BLD VENIPUNCTURE: CPT

## 2020-04-29 PROCEDURE — 82550 ASSAY OF CK (CPK): CPT

## 2020-04-29 PROCEDURE — 83735 ASSAY OF MAGNESIUM: CPT

## 2020-04-29 PROCEDURE — 93970 EXTREMITY STUDY: CPT

## 2020-04-29 PROCEDURE — 10000002 HB ROOM CHARGE MED SURG

## 2020-04-29 PROCEDURE — 85025 COMPLETE CBC W/AUTO DIFF WBC: CPT

## 2020-04-29 PROCEDURE — 72170 X-RAY EXAM OF PELVIS: CPT

## 2020-04-29 PROCEDURE — 85652 RBC SED RATE AUTOMATED: CPT

## 2020-04-29 PROCEDURE — 10004651 HB RX, NO CHARGE ITEM: Performed by: EMERGENCY MEDICINE

## 2020-04-29 PROCEDURE — 73562 X-RAY EXAM OF KNEE 3: CPT

## 2020-04-29 PROCEDURE — 99285 EMERGENCY DEPT VISIT HI MDM: CPT

## 2020-04-29 PROCEDURE — 80048 BASIC METABOLIC PNL TOTAL CA: CPT

## 2020-04-29 PROCEDURE — 86140 C-REACTIVE PROTEIN: CPT

## 2020-04-29 RX ORDER — DIGOXIN 125 MCG
125 TABLET ORAL DAILY
Status: DISCONTINUED | OUTPATIENT
Start: 2020-04-30 | End: 2020-05-01 | Stop reason: HOSPADM

## 2020-04-29 RX ORDER — POTASSIUM CHLORIDE 20 MEQ/1
20 TABLET, EXTENDED RELEASE ORAL DAILY
Status: DISCONTINUED | OUTPATIENT
Start: 2020-04-30 | End: 2020-05-01 | Stop reason: HOSPADM

## 2020-04-29 RX ORDER — FUROSEMIDE 40 MG/1
40 TABLET ORAL DAILY
Status: DISCONTINUED | OUTPATIENT
Start: 2020-04-30 | End: 2020-05-01 | Stop reason: HOSPADM

## 2020-04-29 RX ORDER — ACETAMINOPHEN 500 MG
1000 TABLET ORAL ONCE
Status: COMPLETED | OUTPATIENT
Start: 2020-04-29 | End: 2020-04-29

## 2020-04-29 RX ORDER — ATORVASTATIN CALCIUM 10 MG/1
10 TABLET, FILM COATED ORAL DAILY
Status: DISCONTINUED | OUTPATIENT
Start: 2020-04-30 | End: 2020-05-01 | Stop reason: HOSPADM

## 2020-04-29 RX ORDER — CARVEDILOL 25 MG/1
25 TABLET ORAL 2 TIMES DAILY WITH MEALS
Status: DISCONTINUED | OUTPATIENT
Start: 2020-04-30 | End: 2020-05-01 | Stop reason: HOSPADM

## 2020-04-29 RX ADMIN — ACETAMINOPHEN 1000 MG: 500 TABLET ORAL at 15:10

## 2020-04-29 SDOH — HEALTH STABILITY: MENTAL HEALTH: HOW OFTEN DO YOU HAVE A DRINK CONTAINING ALCOHOL?: NEVER

## 2020-04-29 ASSESSMENT — ACTIVITIES OF DAILY LIVING (ADL)
TOILETING: NEEDS ASSISTANCE
FEEDING YOURSELF: NEEDS ASSISTANCE
TRANSFERRING: NEEDS ASSISTANCE
ADL_BEFORE_ADMISSION: NEEDS/REQUIRES ASSISTANCE
CONTINENCE: NEEDS ASSISTANCE
BATHING: NEEDS ASSISTANCE
MOBILITY_ASSIST_DEVICES: FOUR WHEEL WALKER;CANE
CHRONIC_PAIN_PRESENT: NO
DRESSING YOURSELF: NEEDS ASSISTANCE
ADL_SCORE: 6

## 2020-04-29 ASSESSMENT — COGNITIVE AND FUNCTIONAL STATUS - GENERAL
ARE YOU BLIND OR DO YOU HAVE SERIOUS DIFFICULTY SEEING, EVEN WHEN WEARING GLASSES: NO
BECAUSE OF A PHYSICAL, MENTAL, OR EMOTIONAL CONDITION, DO YOU HAVE SERIOUS DIFFICULTY CONCENTRATING, REMEMBERING OR MAKING DECISIONS: NO
BECAUSE OF A PHYSICAL, MENTAL, OR EMOTIONAL CONDITION, DO YOU HAVE DIFFICULTY DOING ERRANDS ALONE: YES
DO YOU HAVE SERIOUS DIFFICULTY WALKING OR CLIMBING STAIRS: YES
ARE YOU DEAF OR DO YOU HAVE SERIOUS DIFFICULTY  HEARING: NO
DO YOU HAVE DIFFICULTY DRESSING OR BATHING: YES

## 2020-04-29 ASSESSMENT — COLUMBIA-SUICIDE SEVERITY RATING SCALE - C-SSRS
IS THE PATIENT ABLE TO COMPLETE C-SSRS: YES
1. WITHIN THE PAST MONTH, HAVE YOU WISHED YOU WERE DEAD OR WISHED YOU COULD GO TO SLEEP AND NOT WAKE UP?: NO
6. HAVE YOU EVER DONE ANYTHING, STARTED TO DO ANYTHING, OR PREPARED TO DO ANYTHING TO END YOUR LIFE?: NO
1. WITHIN THE PAST MONTH, HAVE YOU WISHED YOU WERE DEAD OR WISHED YOU COULD GO TO SLEEP AND NOT WAKE UP?: NO
IS THE PATIENT ABLE TO COMPLETE C-SSRS: YES
2. HAVE YOU ACTUALLY HAD ANY THOUGHTS OF KILLING YOURSELF?: NO
2. HAVE YOU ACTUALLY HAD ANY THOUGHTS OF KILLING YOURSELF?: NO

## 2020-04-29 ASSESSMENT — LIFESTYLE VARIABLES
AUDIT-C TOTAL SCORE: 0
ALCOHOL_USE_STATUS: NO OR LOW RISK WITH VALIDATED TOOL
HOW OFTEN DO YOU HAVE 6 OR MORE DRINKS ON ONE OCCASION: NEVER
HOW OFTEN DO YOU HAVE A DRINK CONTAINING ALCOHOL: NEVER
HOW MANY STANDARD DRINKS CONTAINING ALCOHOL DO YOU HAVE ON A TYPICAL DAY: 0,1 OR 2

## 2020-04-29 ASSESSMENT — PATIENT HEALTH QUESTIONNAIRE - PHQ9
SUM OF ALL RESPONSES TO PHQ9 QUESTIONS 1 AND 2: 0
2. FEELING DOWN, DEPRESSED OR HOPELESS: NOT AT ALL
IS PATIENT ABLE TO COMPLETE PHQ2 OR PHQ9: YES
SUM OF ALL RESPONSES TO PHQ9 QUESTIONS 1 AND 2: 0
2. FEELING DOWN, DEPRESSED OR HOPELESS: NOT AT ALL
IS PATIENT ABLE TO COMPLETE PHQ2 OR PHQ9: YES
1. LITTLE INTEREST OR PLEASURE IN DOING THINGS: NOT AT ALL
1. LITTLE INTEREST OR PLEASURE IN DOING THINGS: NOT AT ALL
SUM OF ALL RESPONSES TO PHQ9 QUESTIONS 1 AND 2: 0

## 2020-04-29 ASSESSMENT — PAIN SCALES - GENERAL: PAINLEVEL_OUTOF10: 0

## 2020-04-30 PROBLEM — I50.22 HEART FAILURE, CHRONIC SYSTOLIC (CMD): Status: RESOLVED | Noted: 2019-05-30 | Resolved: 2020-04-30

## 2020-04-30 PROBLEM — M11.20 CHONDROCALCINOSIS: Status: ACTIVE | Noted: 2020-04-30

## 2020-04-30 LAB
APPEARANCE FLD: YELLOW
CRYSTALS SNV MICRO: ABNORMAL
LYMPHOCYTES NFR SNV: 1 % (ref 0–78)
MONOCYTES NFR SNV: 6 % (ref 0–71)
NEUTS SEG NFR SNV: 93 % (ref 0–25)
NUC CELL # SNV MANUAL: 4324 /MCL (ref 0–200)
SPECIMEN SOURCE: ABNORMAL
SPECIMEN VOL FLD: 5 ML

## 2020-04-30 PROCEDURE — 87205 SMEAR GRAM STAIN: CPT

## 2020-04-30 PROCEDURE — 89051 BODY FLUID CELL COUNT: CPT

## 2020-04-30 PROCEDURE — 97162 PT EVAL MOD COMPLEX 30 MIN: CPT

## 2020-04-30 PROCEDURE — 0S9C3ZX DRAINAGE OF RIGHT KNEE JOINT, PERCUTANEOUS APPROACH, DIAGNOSTIC: ICD-10-PCS | Performed by: ORTHOPAEDIC SURGERY

## 2020-04-30 PROCEDURE — 97530 THERAPEUTIC ACTIVITIES: CPT

## 2020-04-30 PROCEDURE — 10002800 HB RX 250 W HCPCS: Performed by: INTERNAL MEDICINE

## 2020-04-30 PROCEDURE — 10000002 HB ROOM CHARGE MED SURG

## 2020-04-30 PROCEDURE — 10004651 HB RX, NO CHARGE ITEM: Performed by: INTERNAL MEDICINE

## 2020-04-30 PROCEDURE — 10002803 HB RX 637: Performed by: INTERNAL MEDICINE

## 2020-04-30 RX ORDER — METHYLPREDNISOLONE 4 MG/1
4 TABLET ORAL
Status: DISCONTINUED | OUTPATIENT
Start: 2020-05-05 | End: 2020-05-01 | Stop reason: HOSPADM

## 2020-04-30 RX ORDER — METHYLPREDNISOLONE 4 MG/1
4 TABLET ORAL
Status: DISCONTINUED | OUTPATIENT
Start: 2020-05-02 | End: 2020-05-01 | Stop reason: HOSPADM

## 2020-04-30 RX ORDER — METHYLPREDNISOLONE 4 MG/1
4 TABLET ORAL 2 TIMES DAILY WITH MEALS
Status: DISCONTINUED | OUTPATIENT
Start: 2020-05-04 | End: 2020-05-01 | Stop reason: HOSPADM

## 2020-04-30 RX ORDER — METHYLPREDNISOLONE 4 MG/1
4 TABLET ORAL
Status: DISCONTINUED | OUTPATIENT
Start: 2020-05-03 | End: 2020-05-01 | Stop reason: HOSPADM

## 2020-04-30 RX ORDER — METHYLPREDNISOLONE 4 MG/1
4 TABLET ORAL
Status: DISCONTINUED | OUTPATIENT
Start: 2020-05-01 | End: 2020-05-01 | Stop reason: HOSPADM

## 2020-04-30 RX ORDER — ACETAMINOPHEN 160 MG/5ML
650 LIQUID ORAL EVERY 4 HOURS PRN
Status: DISCONTINUED | OUTPATIENT
Start: 2020-04-30 | End: 2020-05-01 | Stop reason: HOSPADM

## 2020-04-30 RX ORDER — LANOLIN ALCOHOL/MO/W.PET/CERES
6 CREAM (GRAM) TOPICAL NIGHTLY
Status: DISCONTINUED | OUTPATIENT
Start: 2020-04-30 | End: 2020-05-01 | Stop reason: HOSPADM

## 2020-04-30 RX ORDER — MULTIVITAMIN
1 TABLET ORAL DAILY
COMMUNITY

## 2020-04-30 RX ORDER — TRAMADOL HYDROCHLORIDE 50 MG/1
50 TABLET ORAL EVERY 6 HOURS PRN
Status: DISCONTINUED | OUTPATIENT
Start: 2020-04-30 | End: 2020-05-01 | Stop reason: HOSPADM

## 2020-04-30 RX ORDER — ACETAMINOPHEN 325 MG/1
650 TABLET ORAL EVERY 4 HOURS PRN
Status: DISCONTINUED | OUTPATIENT
Start: 2020-04-30 | End: 2020-05-01 | Stop reason: HOSPADM

## 2020-04-30 RX ORDER — METHYLPREDNISOLONE 8 MG/1
24 TABLET ORAL ONCE
Status: COMPLETED | OUTPATIENT
Start: 2020-04-30 | End: 2020-04-30

## 2020-04-30 RX ADMIN — ATORVASTATIN CALCIUM 10 MG: 10 TABLET, FILM COATED ORAL at 09:09

## 2020-04-30 RX ADMIN — MORPHINE SULFATE 2 MG: 2 INJECTION, SOLUTION INTRAMUSCULAR; INTRAVENOUS at 02:13

## 2020-04-30 RX ADMIN — MELATONIN 6 MG: at 20:41

## 2020-04-30 RX ADMIN — CARVEDILOL 25 MG: 25 TABLET, FILM COATED ORAL at 18:06

## 2020-04-30 RX ADMIN — DIGOXIN 125 MCG: 125 TABLET ORAL at 09:09

## 2020-04-30 RX ADMIN — APIXABAN 5 MG: 5 TABLET, FILM COATED ORAL at 20:41

## 2020-04-30 RX ADMIN — CARVEDILOL 25 MG: 25 TABLET, FILM COATED ORAL at 09:09

## 2020-04-30 RX ADMIN — POTASSIUM CHLORIDE 20 MEQ: 1500 TABLET, EXTENDED RELEASE ORAL at 09:13

## 2020-04-30 RX ADMIN — MELATONIN 6 MG: at 02:13

## 2020-04-30 RX ADMIN — METHYLPREDNISOLONE 24 MG: 8 TABLET ORAL at 10:29

## 2020-04-30 RX ADMIN — FUROSEMIDE 40 MG: 40 TABLET ORAL at 09:09

## 2020-04-30 RX ADMIN — APIXABAN 5 MG: 5 TABLET, FILM COATED ORAL at 09:09

## 2020-04-30 RX ADMIN — ASPIRIN 81 MG: 81 TABLET, COATED ORAL at 09:09

## 2020-04-30 ASSESSMENT — COGNITIVE AND FUNCTIONAL STATUS - GENERAL
BASIC_MOBILITY_RAW_SCORE: 15
BASIC_MOBILITY_CONVERTED_SCORE: 36.97

## 2020-04-30 ASSESSMENT — ENCOUNTER SYMPTOMS
ABDOMINAL PAIN: 0
VOMITING: 0
SHORTNESS OF BREATH: 0
FEVER: 0
HEADACHES: 0
WEAKNESS: 0
CHILLS: 0
CONFUSION: 0
NUMBNESS: 0
NAUSEA: 0
COUGH: 0

## 2020-04-30 ASSESSMENT — ACTIVITIES OF DAILY LIVING (ADL): PRIOR_ADL: INDEPENDENT

## 2020-04-30 ASSESSMENT — PAIN SCALES - GENERAL
PAINLEVEL_OUTOF10: 0
PAINLEVEL_OUTOF10: 3
PAINLEVEL_OUTOF10: 9

## 2020-05-01 VITALS
HEART RATE: 69 BPM | WEIGHT: 125.22 LBS | BODY MASS INDEX: 22.19 KG/M2 | SYSTOLIC BLOOD PRESSURE: 153 MMHG | TEMPERATURE: 97.7 F | OXYGEN SATURATION: 98 % | HEIGHT: 63 IN | DIASTOLIC BLOOD PRESSURE: 56 MMHG | RESPIRATION RATE: 18 BRPM

## 2020-05-01 PROCEDURE — 10004651 HB RX, NO CHARGE ITEM: Performed by: INTERNAL MEDICINE

## 2020-05-01 PROCEDURE — 97530 THERAPEUTIC ACTIVITIES: CPT

## 2020-05-01 PROCEDURE — 10002803 HB RX 637: Performed by: INTERNAL MEDICINE

## 2020-05-01 RX ORDER — ACETAMINOPHEN 325 MG/1
650 TABLET ORAL EVERY 4 HOURS PRN
Qty: 60 TABLET | Refills: 3 | Status: SHIPPED | COMMUNITY
Start: 2020-05-01 | End: 2020-05-31

## 2020-05-01 RX ORDER — METHYLPREDNISOLONE 4 MG/1
4 TABLET ORAL
Qty: 10 TABLET | Refills: 0 | Status: SHIPPED | OUTPATIENT
Start: 2020-05-01 | End: 2020-05-09 | Stop reason: ALTCHOICE

## 2020-05-01 RX ORDER — METHYLPREDNISOLONE 4 MG/1
4 TABLET ORAL
Qty: 3 TABLET | Refills: 0 | Status: SHIPPED | OUTPATIENT
Start: 2020-05-03 | End: 2020-05-09 | Stop reason: ALTCHOICE

## 2020-05-01 RX ORDER — METHYLPREDNISOLONE 4 MG/1
4 TABLET ORAL
Qty: 1 TABLET | Refills: 0 | Status: SHIPPED | OUTPATIENT
Start: 2020-05-05 | End: 2020-05-09 | Stop reason: ALTCHOICE

## 2020-05-01 RX ORDER — METHYLPREDNISOLONE 4 MG/1
4 TABLET ORAL 2 TIMES DAILY WITH MEALS
Qty: 2 TABLET | Refills: 0 | Status: SHIPPED | OUTPATIENT
Start: 2020-05-04 | End: 2020-05-09 | Stop reason: ALTCHOICE

## 2020-05-01 RX ORDER — METHYLPREDNISOLONE 4 MG/1
4 TABLET ORAL
Qty: 4 TABLET | Refills: 0 | Status: SHIPPED | OUTPATIENT
Start: 2020-05-02 | End: 2020-05-09 | Stop reason: ALTCHOICE

## 2020-05-01 RX ORDER — LANOLIN ALCOHOL/MO/W.PET/CERES
6 CREAM (GRAM) TOPICAL NIGHTLY
Qty: 30 TABLET | Refills: 0 | Status: SHIPPED | OUTPATIENT
Start: 2020-05-01 | End: 2020-05-09 | Stop reason: ALTCHOICE

## 2020-05-01 RX ADMIN — ASPIRIN 81 MG: 81 TABLET, COATED ORAL at 09:11

## 2020-05-01 RX ADMIN — DIGOXIN 125 MCG: 125 TABLET ORAL at 09:10

## 2020-05-01 RX ADMIN — ATORVASTATIN CALCIUM 10 MG: 10 TABLET, FILM COATED ORAL at 09:10

## 2020-05-01 RX ADMIN — POTASSIUM CHLORIDE 20 MEQ: 1500 TABLET, EXTENDED RELEASE ORAL at 09:12

## 2020-05-01 RX ADMIN — METHYLPREDNISOLONE 4 MG: 4 TABLET ORAL at 09:07

## 2020-05-01 RX ADMIN — METHYLPREDNISOLONE 4 MG: 4 TABLET ORAL at 06:04

## 2020-05-01 RX ADMIN — APIXABAN 5 MG: 5 TABLET, FILM COATED ORAL at 09:11

## 2020-05-01 RX ADMIN — CARVEDILOL 25 MG: 25 TABLET, FILM COATED ORAL at 09:09

## 2020-05-01 RX ADMIN — FUROSEMIDE 40 MG: 40 TABLET ORAL at 09:07

## 2020-05-01 RX ADMIN — METHYLPREDNISOLONE 4 MG: 4 TABLET ORAL at 12:06

## 2020-05-01 ASSESSMENT — PAIN SCALES - GENERAL
PAINLEVEL_OUTOF10: 0
PAINLEVEL_OUTOF10: 0

## 2020-05-01 ASSESSMENT — PAIN SCALES - PAIN ASSESSMENT IN ADVANCED DEMENTIA (PAINAD)
BREATHING: NORMAL
BREATHING: NORMAL

## 2020-05-01 ASSESSMENT — COGNITIVE AND FUNCTIONAL STATUS - GENERAL
BASIC_MOBILITY_CONVERTED_SCORE: 45.55
BASIC_MOBILITY_RAW_SCORE: 21

## 2020-05-07 ENCOUNTER — ANCILLARY PROCEDURE (OUTPATIENT)
Dept: CARDIOLOGY | Age: 85
End: 2020-05-07
Attending: INTERNAL MEDICINE

## 2020-05-07 ENCOUNTER — ANCILLARY ORDERS (OUTPATIENT)
Dept: CARDIOLOGY | Age: 85
End: 2020-05-07

## 2020-05-07 DIAGNOSIS — Z95.810 ICD (IMPLANTABLE CARDIOVERTER-DEFIBRILLATOR) IN PLACE: ICD-10-CM

## 2020-05-07 PROCEDURE — X1114 CARDIAC DEVICE HOME CHECK - REMOTE UNSCHEDULED: HCPCS | Performed by: INTERNAL MEDICINE

## 2020-05-09 ENCOUNTER — HOSPITAL ENCOUNTER (INPATIENT)
Age: 85
LOS: 2 days | Discharge: HOME HEALTH | DRG: 554 | End: 2020-05-11
Attending: EMERGENCY MEDICINE | Admitting: INTERNAL MEDICINE

## 2020-05-09 ENCOUNTER — APPOINTMENT (OUTPATIENT)
Dept: GENERAL RADIOLOGY | Age: 85
DRG: 554 | End: 2020-05-09
Attending: EMERGENCY MEDICINE

## 2020-05-09 DIAGNOSIS — M79.605 PAIN IN BOTH LOWER EXTREMITIES: Primary | ICD-10-CM

## 2020-05-09 DIAGNOSIS — M79.604 PAIN IN BOTH LOWER EXTREMITIES: Primary | ICD-10-CM

## 2020-05-09 DIAGNOSIS — M25.579 PAIN IN JOINT INVOLVING ANKLE AND FOOT, UNSPECIFIED LATERALITY: ICD-10-CM

## 2020-05-09 DIAGNOSIS — I10 ESSENTIAL HYPERTENSION: ICD-10-CM

## 2020-05-09 DIAGNOSIS — I42.0 DCM (DILATED CARDIOMYOPATHY) (CMD): ICD-10-CM

## 2020-05-09 LAB
ANION GAP SERPL CALC-SCNC: 8 MMOL/L (ref 10–20)
BASOPHILS # BLD: 0 K/MCL (ref 0–0.3)
BASOPHILS NFR BLD: 0 %
BUN SERPL-MCNC: 15 MG/DL (ref 6–20)
BUN/CREAT SERPL: 21 (ref 7–25)
CALCIUM SERPL-MCNC: 9.6 MG/DL (ref 8.4–10.2)
CHLORIDE SERPL-SCNC: 102 MMOL/L (ref 98–107)
CO2 SERPL-SCNC: 29 MMOL/L (ref 21–32)
CREAT SERPL-MCNC: 0.72 MG/DL (ref 0.51–0.95)
CRP SERPL-MCNC: 7 MG/DL
DIFFERENTIAL METHOD BLD: ABNORMAL
EOSINOPHIL # BLD: 0.4 K/MCL (ref 0.1–0.5)
EOSINOPHIL NFR BLD: 3 %
ERYTHROCYTE [DISTWIDTH] IN BLOOD: 14.8 % (ref 11–15)
ERYTHROCYTE [SEDIMENTATION RATE] IN BLOOD BY WESTERGREN METHOD: 46 MM/HR (ref 0–20)
GLUCOSE SERPL-MCNC: 139 MG/DL (ref 65–99)
HCT VFR BLD CALC: 38.6 % (ref 36–46.5)
HGB BLD-MCNC: 12.8 G/DL (ref 12–15.5)
LYMPHOCYTES # BLD: 4.1 K/MCL (ref 1–4)
LYMPHOCYTES NFR BLD: 31 %
MCH RBC QN AUTO: 27.1 PG (ref 26–34)
MCHC RBC AUTO-ENTMCNC: 33.2 G/DL (ref 32–36.5)
MCV RBC AUTO: 81.6 FL (ref 78–100)
METAMYELOCYTES NFR BLD: 1 % (ref 0–2)
MONOCYTES # BLD: 0.4 K/MCL (ref 0.3–0.9)
MONOCYTES NFR BLD: 3 %
NEUTROPHILS # BLD: 8.2 K/MCL (ref 1.8–7.7)
NEUTS BAND NFR BLD: 2 % (ref 0–10)
NEUTS SEG NFR BLD: 60 %
NRBC BLD MANUAL-RTO: 0 /100 WBC
PLAT MORPH BLD: NORMAL
PLATELET # BLD: 141 K/MCL (ref 140–450)
POTASSIUM SERPL-SCNC: 4.5 MMOL/L (ref 3.4–5.1)
RBC # BLD: 4.73 MIL/MCL (ref 4–5.2)
RBC MORPH BLD: NORMAL
SODIUM SERPL-SCNC: 135 MMOL/L (ref 135–145)
WBC # BLD: 13.2 K/MCL (ref 4.2–11)
WBC MORPH BLD: NORMAL

## 2020-05-09 PROCEDURE — 86140 C-REACTIVE PROTEIN: CPT

## 2020-05-09 PROCEDURE — 73523 X-RAY EXAM HIPS BI 5/> VIEWS: CPT

## 2020-05-09 PROCEDURE — 36415 COLL VENOUS BLD VENIPUNCTURE: CPT

## 2020-05-09 PROCEDURE — 10000002 HB ROOM CHARGE MED SURG

## 2020-05-09 PROCEDURE — 99284 EMERGENCY DEPT VISIT MOD MDM: CPT

## 2020-05-09 PROCEDURE — 80048 BASIC METABOLIC PNL TOTAL CA: CPT

## 2020-05-09 PROCEDURE — 72100 X-RAY EXAM L-S SPINE 2/3 VWS: CPT

## 2020-05-09 PROCEDURE — 10004651 HB RX, NO CHARGE ITEM: Performed by: INTERNAL MEDICINE

## 2020-05-09 PROCEDURE — 10002803 HB RX 637: Performed by: INTERNAL MEDICINE

## 2020-05-09 PROCEDURE — 85025 COMPLETE CBC W/AUTO DIFF WBC: CPT

## 2020-05-09 PROCEDURE — 85652 RBC SED RATE AUTOMATED: CPT

## 2020-05-09 RX ORDER — CARVEDILOL 25 MG/1
25 TABLET ORAL EVERY 12 HOURS SCHEDULED
Status: DISCONTINUED | OUTPATIENT
Start: 2020-05-09 | End: 2020-05-11 | Stop reason: HOSPADM

## 2020-05-09 RX ORDER — POTASSIUM CHLORIDE 20 MEQ/1
20 TABLET, EXTENDED RELEASE ORAL DAILY
Status: DISCONTINUED | OUTPATIENT
Start: 2020-05-10 | End: 2020-05-11 | Stop reason: HOSPADM

## 2020-05-09 RX ORDER — ATORVASTATIN CALCIUM 10 MG/1
10 TABLET, FILM COATED ORAL DAILY
Status: DISCONTINUED | OUTPATIENT
Start: 2020-05-10 | End: 2020-05-11 | Stop reason: HOSPADM

## 2020-05-09 RX ORDER — MULTIVITAMIN,THER AND MINERALS
1 TABLET ORAL DAILY
Status: DISCONTINUED | OUTPATIENT
Start: 2020-05-10 | End: 2020-05-11 | Stop reason: HOSPADM

## 2020-05-09 RX ORDER — DIGOXIN 125 MCG
125 TABLET ORAL DAILY
Status: DISCONTINUED | OUTPATIENT
Start: 2020-05-10 | End: 2020-05-11 | Stop reason: HOSPADM

## 2020-05-09 RX ORDER — FUROSEMIDE 40 MG/1
40 TABLET ORAL DAILY
Status: DISCONTINUED | OUTPATIENT
Start: 2020-05-10 | End: 2020-05-11 | Stop reason: HOSPADM

## 2020-05-09 RX ORDER — TRAMADOL HYDROCHLORIDE 50 MG/1
50 TABLET ORAL EVERY 6 HOURS PRN
Status: DISCONTINUED | OUTPATIENT
Start: 2020-05-09 | End: 2020-05-11 | Stop reason: HOSPADM

## 2020-05-09 RX ORDER — ACETAMINOPHEN 325 MG/1
650 TABLET ORAL EVERY 4 HOURS PRN
Status: DISCONTINUED | OUTPATIENT
Start: 2020-05-09 | End: 2020-05-11 | Stop reason: HOSPADM

## 2020-05-09 RX ADMIN — ACETAMINOPHEN 650 MG: 325 TABLET ORAL at 21:32

## 2020-05-09 RX ADMIN — APIXABAN 2.5 MG: 2.5 TABLET, FILM COATED ORAL at 20:49

## 2020-05-09 RX ADMIN — CARVEDILOL 25 MG: 25 TABLET, FILM COATED ORAL at 20:49

## 2020-05-09 SDOH — HEALTH STABILITY: MENTAL HEALTH: HOW OFTEN DO YOU HAVE A DRINK CONTAINING ALCOHOL?: NEVER

## 2020-05-09 ASSESSMENT — PATIENT HEALTH QUESTIONNAIRE - PHQ9
SUM OF ALL RESPONSES TO PHQ9 QUESTIONS 1 AND 2: 0
1. LITTLE INTEREST OR PLEASURE IN DOING THINGS: NOT AT ALL
SUM OF ALL RESPONSES TO PHQ9 QUESTIONS 1 AND 2: 0
2. FEELING DOWN, DEPRESSED OR HOPELESS: NOT AT ALL
IS PATIENT ABLE TO COMPLETE PHQ2 OR PHQ9: YES

## 2020-05-09 ASSESSMENT — ENCOUNTER SYMPTOMS
ABDOMINAL PAIN: 0
FEVER: 0
VOMITING: 0
CONFUSION: 0
HEADACHES: 0
COUGH: 0
SHORTNESS OF BREATH: 0
NAUSEA: 0
WEAKNESS: 0
NUMBNESS: 0
CHILLS: 0

## 2020-05-09 ASSESSMENT — COLUMBIA-SUICIDE SEVERITY RATING SCALE - C-SSRS
1. WITHIN THE PAST MONTH, HAVE YOU WISHED YOU WERE DEAD OR WISHED YOU COULD GO TO SLEEP AND NOT WAKE UP?: NO
2. HAVE YOU ACTUALLY HAD ANY THOUGHTS OF KILLING YOURSELF?: NO
6. HAVE YOU EVER DONE ANYTHING, STARTED TO DO ANYTHING, OR PREPARED TO DO ANYTHING TO END YOUR LIFE?: NO
IS THE PATIENT ABLE TO COMPLETE C-SSRS: YES

## 2020-05-09 ASSESSMENT — ACTIVITIES OF DAILY LIVING (ADL)
CHRONIC_PAIN_PRESENT: NO
MOBILITY_ASSIST_DEVICES: STANDARD WALKER
ADL_BEFORE_ADMISSION: INDEPENDENT
ADL_SCORE: 12
RECENT_DECLINE_ADL: YES, DECLINE IN BATHING/DRESSING/FEEDING, COLLABORATE WITH PROVIDER (T)

## 2020-05-09 ASSESSMENT — LIFESTYLE VARIABLES
HOW OFTEN DO YOU HAVE 6 OR MORE DRINKS ON ONE OCCASION: NEVER
HOW OFTEN DO YOU HAVE A DRINK CONTAINING ALCOHOL: NEVER
HOW MANY STANDARD DRINKS CONTAINING ALCOHOL DO YOU HAVE ON A TYPICAL DAY: 0,1 OR 2
ALCOHOL_USE_STATUS: NO OR LOW RISK WITH VALIDATED TOOL
AUDIT-C TOTAL SCORE: 0

## 2020-05-09 ASSESSMENT — PAIN SCALES - GENERAL
PAINLEVEL_OUTOF10: 9
PAINLEVEL_OUTOF10: 0
PAINLEVEL_OUTOF10: 3
PAINLEVEL_OUTOF10: 7

## 2020-05-09 ASSESSMENT — COGNITIVE AND FUNCTIONAL STATUS - GENERAL
DO YOU HAVE SERIOUS DIFFICULTY WALKING OR CLIMBING STAIRS: YES
BECAUSE OF A PHYSICAL, MENTAL, OR EMOTIONAL CONDITION, DO YOU HAVE SERIOUS DIFFICULTY CONCENTRATING, REMEMBERING OR MAKING DECISIONS: YES
ARE YOU BLIND OR DO YOU HAVE SERIOUS DIFFICULTY SEEING, EVEN WHEN WEARING GLASSES: NO
ARE YOU DEAF OR DO YOU HAVE SERIOUS DIFFICULTY  HEARING: NO
DO YOU HAVE DIFFICULTY DRESSING OR BATHING: NO
BECAUSE OF A PHYSICAL, MENTAL, OR EMOTIONAL CONDITION, DO YOU HAVE DIFFICULTY DOING ERRANDS ALONE: NO

## 2020-05-10 PROBLEM — M11.20 PSEUDOGOUT: Status: ACTIVE | Noted: 2020-05-10

## 2020-05-10 PROBLEM — M25.579 PAIN IN JOINT, ANKLE AND FOOT: Status: ACTIVE | Noted: 2020-05-10

## 2020-05-10 PROCEDURE — 10000002 HB ROOM CHARGE MED SURG

## 2020-05-10 PROCEDURE — 10004651 HB RX, NO CHARGE ITEM: Performed by: INTERNAL MEDICINE

## 2020-05-10 PROCEDURE — 97530 THERAPEUTIC ACTIVITIES: CPT

## 2020-05-10 PROCEDURE — 10002803 HB RX 637: Performed by: INTERNAL MEDICINE

## 2020-05-10 PROCEDURE — 97161 PT EVAL LOW COMPLEX 20 MIN: CPT

## 2020-05-10 RX ORDER — METHYLPREDNISOLONE 4 MG/1
4 TABLET ORAL
Status: DISCONTINUED | OUTPATIENT
Start: 2020-05-13 | End: 2020-05-11 | Stop reason: HOSPADM

## 2020-05-10 RX ORDER — ANALGESIC BALM 1.74; 4.06 G/29G; G/29G
OINTMENT TOPICAL EVERY 6 HOURS PRN
Status: DISCONTINUED | OUTPATIENT
Start: 2020-05-10 | End: 2020-05-11 | Stop reason: HOSPADM

## 2020-05-10 RX ORDER — METHYLPREDNISOLONE 4 MG/1
4 TABLET ORAL
Status: DISCONTINUED | OUTPATIENT
Start: 2020-05-11 | End: 2020-05-11 | Stop reason: HOSPADM

## 2020-05-10 RX ORDER — METHYLPREDNISOLONE 4 MG/1
4 TABLET ORAL
Status: DISCONTINUED | OUTPATIENT
Start: 2020-05-15 | End: 2020-05-11 | Stop reason: HOSPADM

## 2020-05-10 RX ORDER — METHYLPREDNISOLONE 8 MG/1
24 TABLET ORAL ONCE
Status: COMPLETED | OUTPATIENT
Start: 2020-05-10 | End: 2020-05-10

## 2020-05-10 RX ORDER — METHYLPREDNISOLONE 4 MG/1
4 TABLET ORAL
Status: DISCONTINUED | OUTPATIENT
Start: 2020-05-12 | End: 2020-05-11 | Stop reason: HOSPADM

## 2020-05-10 RX ORDER — METHYLPREDNISOLONE 4 MG/1
4 TABLET ORAL 2 TIMES DAILY WITH MEALS
Status: DISCONTINUED | OUTPATIENT
Start: 2020-05-14 | End: 2020-05-11 | Stop reason: HOSPADM

## 2020-05-10 RX ADMIN — ACETAMINOPHEN 650 MG: 325 TABLET ORAL at 12:53

## 2020-05-10 RX ADMIN — APIXABAN 2.5 MG: 2.5 TABLET, FILM COATED ORAL at 09:23

## 2020-05-10 RX ADMIN — Medication 1 TABLET: at 09:28

## 2020-05-10 RX ADMIN — DIGOXIN 125 MCG: 125 TABLET ORAL at 09:26

## 2020-05-10 RX ADMIN — ATORVASTATIN CALCIUM 10 MG: 10 TABLET, FILM COATED ORAL at 09:24

## 2020-05-10 RX ADMIN — FUROSEMIDE 40 MG: 40 TABLET ORAL at 09:27

## 2020-05-10 RX ADMIN — METHYLPREDNISOLONE 24 MG: 8 TABLET ORAL at 13:30

## 2020-05-10 RX ADMIN — CARVEDILOL 25 MG: 25 TABLET, FILM COATED ORAL at 20:09

## 2020-05-10 RX ADMIN — CARVEDILOL 25 MG: 25 TABLET, FILM COATED ORAL at 09:24

## 2020-05-10 RX ADMIN — POTASSIUM CHLORIDE 20 MEQ: 1500 TABLET, EXTENDED RELEASE ORAL at 09:25

## 2020-05-10 RX ADMIN — APIXABAN 2.5 MG: 2.5 TABLET, FILM COATED ORAL at 20:09

## 2020-05-10 RX ADMIN — ASPIRIN 81 MG: 81 TABLET, COATED ORAL at 09:23

## 2020-05-10 ASSESSMENT — COGNITIVE AND FUNCTIONAL STATUS - GENERAL
BASIC_MOBILITY_RAW_SCORE: 16
BASIC_MOBILITY_CONVERTED_SCORE: 38.32

## 2020-05-10 ASSESSMENT — PAIN SCALES - GENERAL
PAINLEVEL_OUTOF10: 5
PAINLEVEL_OUTOF10: 5
PAINLEVEL_OUTOF10: 0
PAINLEVEL_OUTOF10: 2
PAINLEVEL_OUTOF10: 4

## 2020-05-11 VITALS
OXYGEN SATURATION: 97 % | RESPIRATION RATE: 16 BRPM | TEMPERATURE: 97.7 F | BODY MASS INDEX: 22.27 KG/M2 | HEART RATE: 68 BPM | DIASTOLIC BLOOD PRESSURE: 66 MMHG | WEIGHT: 121.03 LBS | SYSTOLIC BLOOD PRESSURE: 147 MMHG | HEIGHT: 62 IN

## 2020-05-11 LAB
BASOPHILS # BLD: 0 K/MCL (ref 0–0.3)
BASOPHILS NFR BLD: 0 %
BURR CELLS BLD QL SMEAR: ABNORMAL
DIFFERENTIAL METHOD BLD: ABNORMAL
EOSINOPHIL # BLD: 0 K/MCL (ref 0.1–0.5)
EOSINOPHIL NFR BLD: 0 %
ERYTHROCYTE [DISTWIDTH] IN BLOOD: 14.5 % (ref 11–15)
HCT VFR BLD CALC: 36.9 % (ref 36–46.5)
HGB BLD-MCNC: 12.4 G/DL (ref 12–15.5)
LYMPHOCYTES # BLD: 1.5 K/MCL (ref 1–4)
LYMPHOCYTES NFR BLD: 14 %
MCH RBC QN AUTO: 27.1 PG (ref 26–34)
MCHC RBC AUTO-ENTMCNC: 33.6 G/DL (ref 32–36.5)
MCV RBC AUTO: 80.7 FL (ref 78–100)
MONOCYTES # BLD: 0.3 K/MCL (ref 0.3–0.9)
MONOCYTES NFR BLD: 3 %
NEUTROPHILS # BLD: 9 K/MCL (ref 1.8–7.7)
NEUTS SEG NFR BLD: 83 %
NRBC BLD MANUAL-RTO: 0 /100 WBC
OVALOCYTES BLD QL SMEAR: ABNORMAL
PLAT MORPH BLD: NORMAL
PLATELET # BLD: 234 K/MCL (ref 140–450)
RBC # BLD: 4.57 MIL/MCL (ref 4–5.2)
WBC # BLD: 10.9 K/MCL (ref 4.2–11)
WBC MORPH BLD: NORMAL

## 2020-05-11 PROCEDURE — 10004651 HB RX, NO CHARGE ITEM: Performed by: INTERNAL MEDICINE

## 2020-05-11 PROCEDURE — 36415 COLL VENOUS BLD VENIPUNCTURE: CPT

## 2020-05-11 PROCEDURE — 85025 COMPLETE CBC W/AUTO DIFF WBC: CPT

## 2020-05-11 PROCEDURE — 10002803 HB RX 637: Performed by: INTERNAL MEDICINE

## 2020-05-11 RX ORDER — METHYLPREDNISOLONE 4 MG/1
4 TABLET ORAL
Qty: 1 TABLET | Refills: 0 | Status: SHIPPED
Start: 2020-05-15 | End: 2020-05-16

## 2020-05-11 RX ORDER — METHYLPREDNISOLONE 4 MG/1
4 TABLET ORAL
Qty: 4 TABLET | Refills: 0 | Status: SHIPPED
Start: 2020-05-12 | End: 2020-05-13

## 2020-05-11 RX ORDER — METHYLPREDNISOLONE 4 MG/1
4 TABLET ORAL
Qty: 5 TABLET | Refills: 0 | Status: SHIPPED
Start: 2020-05-11 | End: 2020-05-12

## 2020-05-11 RX ORDER — METHYLPREDNISOLONE 4 MG/1
4 TABLET ORAL
Qty: 3 TABLET | Refills: 0 | Status: SHIPPED
Start: 2020-05-13 | End: 2020-05-14

## 2020-05-11 RX ORDER — METHYLPREDNISOLONE 4 MG/1
4 TABLET ORAL 2 TIMES DAILY WITH MEALS
Qty: 2 TABLET | Refills: 0 | Status: SHIPPED
Start: 2020-05-14 | End: 2020-05-15

## 2020-05-11 RX ORDER — ANALGESIC BALM 1.74; 4.06 G/29G; G/29G
OINTMENT TOPICAL EVERY 6 HOURS PRN
Qty: 69 G | Refills: 3 | Status: SHIPPED | OUTPATIENT
Start: 2020-05-11

## 2020-05-11 RX ADMIN — Medication 1 TABLET: at 10:28

## 2020-05-11 RX ADMIN — ATORVASTATIN CALCIUM 10 MG: 10 TABLET, FILM COATED ORAL at 10:27

## 2020-05-11 RX ADMIN — METHYLPREDNISOLONE 4 MG: 4 TABLET ORAL at 05:55

## 2020-05-11 RX ADMIN — POTASSIUM CHLORIDE 20 MEQ: 1500 TABLET, EXTENDED RELEASE ORAL at 10:28

## 2020-05-11 RX ADMIN — FUROSEMIDE 40 MG: 40 TABLET ORAL at 10:34

## 2020-05-11 RX ADMIN — ASPIRIN 81 MG: 81 TABLET, COATED ORAL at 10:27

## 2020-05-11 RX ADMIN — DIGOXIN 125 MCG: 125 TABLET ORAL at 10:28

## 2020-05-11 RX ADMIN — METHYLPREDNISOLONE 4 MG: 4 TABLET ORAL at 13:12

## 2020-05-11 RX ADMIN — APIXABAN 2.5 MG: 2.5 TABLET, FILM COATED ORAL at 10:26

## 2020-05-11 RX ADMIN — CARVEDILOL 25 MG: 25 TABLET, FILM COATED ORAL at 10:27

## 2020-05-11 RX ADMIN — METHYLPREDNISOLONE 4 MG: 4 TABLET ORAL at 10:28

## 2020-05-11 ASSESSMENT — COGNITIVE AND FUNCTIONAL STATUS - GENERAL
DO YOU HAVE DIFFICULTY DRESSING OR BATHING: NO
DO YOU HAVE SERIOUS DIFFICULTY WALKING OR CLIMBING STAIRS: YES
BECAUSE OF A PHYSICAL, MENTAL, OR EMOTIONAL CONDITION, DO YOU HAVE SERIOUS DIFFICULTY CONCENTRATING, REMEMBERING OR MAKING DECISIONS: NO
BECAUSE OF A PHYSICAL, MENTAL, OR EMOTIONAL CONDITION, DO YOU HAVE DIFFICULTY DOING ERRANDS ALONE: NO

## 2020-05-11 ASSESSMENT — PAIN SCALES - GENERAL
PAINLEVEL_OUTOF10: 0
PAINLEVEL_OUTOF10: 0

## 2020-05-14 LAB
ANNOTATION COMMENT IMP: NORMAL
BACTERIA SPEC CULT: NORMAL
GRAM STN SPEC: NORMAL
GRAM STN SPEC: NORMAL
REPORT STATUS (RPT): NORMAL
SPECIMEN SOURCE: NORMAL

## 2020-07-13 ENCOUNTER — APPOINTMENT (OUTPATIENT)
Dept: CARDIOLOGY | Age: 85
End: 2020-07-13
Attending: EMERGENCY MEDICINE

## 2020-07-13 ENCOUNTER — APPOINTMENT (OUTPATIENT)
Dept: GENERAL RADIOLOGY | Age: 85
End: 2020-07-13
Attending: EMERGENCY MEDICINE

## 2020-07-13 ENCOUNTER — HOSPITAL ENCOUNTER (EMERGENCY)
Age: 85
Discharge: HOME | End: 2020-07-13
Attending: EMERGENCY MEDICINE

## 2020-07-13 VITALS
DIASTOLIC BLOOD PRESSURE: 71 MMHG | HEIGHT: 64 IN | OXYGEN SATURATION: 95 % | WEIGHT: 129.63 LBS | HEART RATE: 83 BPM | TEMPERATURE: 97.3 F | SYSTOLIC BLOOD PRESSURE: 115 MMHG | RESPIRATION RATE: 16 BRPM | BODY MASS INDEX: 22.13 KG/M2

## 2020-07-13 DIAGNOSIS — I48.20 CHRONIC ATRIAL FIBRILLATION (CMD): ICD-10-CM

## 2020-07-13 DIAGNOSIS — R07.9 CHEST PAIN WITH MODERATE RISK OF ACUTE CORONARY SYNDROME: Primary | ICD-10-CM

## 2020-07-13 DIAGNOSIS — Z79.01 CURRENT USE OF LONG TERM ANTICOAGULATION: ICD-10-CM

## 2020-07-13 DIAGNOSIS — F43.0 ACUTE STRESS REACTION: ICD-10-CM

## 2020-07-13 PROBLEM — Z92.89 H/O TRANSFUSION OF PACKED RED BLOOD CELLS: Status: ACTIVE | Noted: 2020-07-13

## 2020-07-13 PROBLEM — R06.83 SNORING: Status: ACTIVE | Noted: 2020-07-13

## 2020-07-13 PROBLEM — K59.09 CHRONIC CONSTIPATION: Status: ACTIVE | Noted: 2020-07-13

## 2020-07-13 PROBLEM — I34.0 MITRAL REGURGITATION: Status: ACTIVE | Noted: 2020-07-13

## 2020-07-13 PROBLEM — G47.00 INSOMNIA: Status: ACTIVE | Noted: 2020-07-13

## 2020-07-13 PROBLEM — C18.9 COLON CANCER  (CMD): Status: ACTIVE | Noted: 2020-07-13

## 2020-07-13 PROBLEM — C50.919 BREAST CANCER (CMD): Status: ACTIVE | Noted: 2020-07-13

## 2020-07-13 PROBLEM — J18.9 PNEUMONIA: Status: ACTIVE | Noted: 2020-07-13

## 2020-07-13 PROBLEM — I25.10 CORONARY ARTERY DISEASE: Status: ACTIVE | Noted: 2020-07-13

## 2020-07-13 PROBLEM — G89.29 CHRONIC PAIN: Status: ACTIVE | Noted: 2020-07-13

## 2020-07-13 PROBLEM — H26.9 CATARACT: Status: ACTIVE | Noted: 2020-07-13

## 2020-07-13 PROBLEM — M19.90 ARTHRITIS: Status: ACTIVE | Noted: 2020-07-13

## 2020-07-13 PROBLEM — E03.9 HYPOTHYROID: Status: ACTIVE | Noted: 2020-07-13

## 2020-07-13 LAB
ANION GAP SERPL CALC-SCNC: 10 MMOL/L (ref 10–20)
ATRIAL RATE (BPM): 30
BASOPHILS # BLD: 0 K/MCL (ref 0–0.3)
BASOPHILS NFR BLD: 0 %
BUN SERPL-MCNC: 18 MG/DL (ref 6–20)
BUN/CREAT SERPL: 21 (ref 7–25)
BURR CELLS BLD QL SMEAR: ABNORMAL
CALCIUM SERPL-MCNC: 9.3 MG/DL (ref 8.4–10.2)
CHLORIDE SERPL-SCNC: 108 MMOL/L (ref 98–107)
CO2 SERPL-SCNC: 27 MMOL/L (ref 21–32)
CREAT SERPL-MCNC: 0.84 MG/DL (ref 0.51–0.95)
DIFFERENTIAL METHOD BLD: ABNORMAL
DIGOXIN SERPL-MCNC: 0.6 NG/ML (ref 0.8–2.1)
EOSINOPHIL # BLD: 0.1 K/MCL (ref 0.1–0.5)
EOSINOPHIL NFR BLD: 1 %
ERYTHROCYTE [DISTWIDTH] IN BLOOD: 15.9 % (ref 11–15)
GLUCOSE SERPL-MCNC: 186 MG/DL (ref 65–99)
HCT VFR BLD CALC: 35.5 % (ref 36–46.5)
HGB BLD-MCNC: 11 G/DL (ref 12–15.5)
LYMPHOCYTES # BLD: 2 K/MCL (ref 1–4)
LYMPHOCYTES NFR BLD: 18 %
MAGNESIUM SERPL-MCNC: 2 MG/DL (ref 1.7–2.4)
MCH RBC QN AUTO: 26.6 PG (ref 26–34)
MCHC RBC AUTO-ENTMCNC: 31 G/DL (ref 32–36.5)
MCV RBC AUTO: 86 FL (ref 78–100)
MONOCYTES # BLD: 0.3 K/MCL (ref 0.3–0.9)
MONOCYTES NFR BLD: 3 %
NEUTROPHILS # BLD: 8.6 K/MCL (ref 1.8–7.7)
NEUTS SEG NFR BLD: 78 %
NRBC BLD MANUAL-RTO: 0 /100 WBC
NT-PROBNP SERPL-MCNC: 4469 PG/ML
OVALOCYTES BLD QL SMEAR: ABNORMAL
PLAT MORPH BLD: NORMAL
PLATELET # BLD: 116 K/MCL (ref 140–450)
POTASSIUM SERPL-SCNC: 3.8 MMOL/L (ref 3.4–5.1)
QRS-INTERVAL (MSEC): 138
QT-INTERVAL (MSEC): 360
QTC: 541
R AXIS (DEGREES): -51
RBC # BLD: 4.13 MIL/MCL (ref 4–5.2)
REPORT TEXT: NORMAL
SODIUM SERPL-SCNC: 141 MMOL/L (ref 135–145)
T AXIS (DEGREES): 115
TROPONIN I SERPL HS-MCNC: 0.03 NG/ML
TROPONIN I SERPL HS-MCNC: 0.04 NG/ML
VENTRICULAR RATE EKG/MIN (BPM): 136
WBC # BLD: 11 K/MCL (ref 4.2–11)
WBC MORPH BLD: NORMAL

## 2020-07-13 PROCEDURE — 10002800 HB RX 250 W HCPCS: Performed by: EMERGENCY MEDICINE

## 2020-07-13 PROCEDURE — 83735 ASSAY OF MAGNESIUM: CPT

## 2020-07-13 PROCEDURE — 80162 ASSAY OF DIGOXIN TOTAL: CPT

## 2020-07-13 PROCEDURE — 85025 COMPLETE CBC W/AUTO DIFF WBC: CPT

## 2020-07-13 PROCEDURE — 84484 ASSAY OF TROPONIN QUANT: CPT

## 2020-07-13 PROCEDURE — 36415 COLL VENOUS BLD VENIPUNCTURE: CPT

## 2020-07-13 PROCEDURE — A9500 TC99M SESTAMIBI: HCPCS | Performed by: EMERGENCY MEDICINE

## 2020-07-13 PROCEDURE — 78452 HT MUSCLE IMAGE SPECT MULT: CPT

## 2020-07-13 PROCEDURE — 10002803 HB RX 637: Performed by: EMERGENCY MEDICINE

## 2020-07-13 PROCEDURE — 93005 ELECTROCARDIOGRAM TRACING: CPT | Performed by: EMERGENCY MEDICINE

## 2020-07-13 PROCEDURE — 83880 ASSAY OF NATRIURETIC PEPTIDE: CPT

## 2020-07-13 PROCEDURE — 10006150 HB RX 343: Performed by: EMERGENCY MEDICINE

## 2020-07-13 PROCEDURE — 99285 EMERGENCY DEPT VISIT HI MDM: CPT

## 2020-07-13 PROCEDURE — 80048 BASIC METABOLIC PNL TOTAL CA: CPT

## 2020-07-13 PROCEDURE — 71046 X-RAY EXAM CHEST 2 VIEWS: CPT

## 2020-07-13 PROCEDURE — 93017 CV STRESS TEST TRACING ONLY: CPT

## 2020-07-13 RX ORDER — DIGOXIN 125 MCG
125 TABLET ORAL ONCE
Status: COMPLETED | OUTPATIENT
Start: 2020-07-13 | End: 2020-07-13

## 2020-07-13 RX ORDER — DONEPEZIL HYDROCHLORIDE 5 MG/1
5 TABLET, FILM COATED ORAL AT BEDTIME
COMMUNITY
Start: 2020-06-29 | End: 2021-01-01

## 2020-07-13 RX ORDER — LORAZEPAM 1 MG/1
1 TABLET ORAL ONCE
Status: COMPLETED | OUTPATIENT
Start: 2020-07-13 | End: 2020-07-13

## 2020-07-13 RX ORDER — REGADENOSON 0.08 MG/ML
0.4 INJECTION, SOLUTION INTRAVENOUS ONCE
Status: COMPLETED | OUTPATIENT
Start: 2020-07-13 | End: 2020-07-13

## 2020-07-13 RX ORDER — CARVEDILOL 25 MG/1
25 TABLET ORAL ONCE
Status: COMPLETED | OUTPATIENT
Start: 2020-07-13 | End: 2020-07-13

## 2020-07-13 RX ORDER — TETRAKIS(2-METHOXYISOBUTYLISOCYANIDE)COPPER(I) TETRAFLUOROBORATE 1 MG/ML
8 INJECTION, POWDER, LYOPHILIZED, FOR SOLUTION INTRAVENOUS ONCE
Status: COMPLETED | OUTPATIENT
Start: 2020-07-13 | End: 2020-07-13

## 2020-07-13 RX ORDER — TETRAKIS(2-METHOXYISOBUTYLISOCYANIDE)COPPER(I) TETRAFLUOROBORATE 1 MG/ML
24 INJECTION, POWDER, LYOPHILIZED, FOR SOLUTION INTRAVENOUS ONCE
Status: COMPLETED | OUTPATIENT
Start: 2020-07-13 | End: 2020-07-13

## 2020-07-13 RX ORDER — LOSARTAN POTASSIUM 25 MG/1
25 TABLET ORAL DAILY
COMMUNITY
End: 2021-01-01 | Stop reason: ALTCHOICE

## 2020-07-13 RX ADMIN — TETRAKIS(2-METHOXYISOBUTYLISOCYANIDE)COPPER(I) TETRAFLUOROBORATE 28 MILLICURIE: 1 INJECTION, POWDER, LYOPHILIZED, FOR SOLUTION INTRAVENOUS at 13:00

## 2020-07-13 RX ADMIN — LORAZEPAM 1 MG: 1 TABLET ORAL at 09:16

## 2020-07-13 RX ADMIN — TETRAKIS(2-METHOXYISOBUTYLISOCYANIDE)COPPER(I) TETRAFLUOROBORATE 10.8 MILLICURIE: 1 INJECTION, POWDER, LYOPHILIZED, FOR SOLUTION INTRAVENOUS at 11:15

## 2020-07-13 RX ADMIN — REGADENOSON 0.4 MG: 0.08 INJECTION, SOLUTION INTRAVENOUS at 13:00

## 2020-07-13 RX ADMIN — DIGOXIN 125 MCG: 125 TABLET ORAL at 10:21

## 2020-07-13 RX ADMIN — CARVEDILOL 25 MG: 25 TABLET, FILM COATED ORAL at 09:16

## 2020-07-13 SDOH — HEALTH STABILITY: MENTAL HEALTH: HOW OFTEN DO YOU HAVE A DRINK CONTAINING ALCOHOL?: NEVER

## 2020-07-13 ASSESSMENT — ENCOUNTER SYMPTOMS
CHEST TIGHTNESS: 1
HEADACHES: 0
SHORTNESS OF BREATH: 1
FEVER: 0
ABDOMINAL PAIN: 0
HALLUCINATIONS: 0
BRUISES/BLEEDS EASILY: 1
DIZZINESS: 0
NAUSEA: 0
NERVOUS/ANXIOUS: 1

## 2020-07-13 ASSESSMENT — HEART SCORE
TROPONIN: EQUAL OR LESS THAN NORMAL LIMIT
HISTORY: SLIGHTLY SUSPICIOUS
RISK FACTORS: EQUAL OR GREATER  THAN 3 RISK FACTORS OR HISTORY OF ATHEROSCLEROTIC DISEASE
AGE: EQUAL OR GREATER THAN 65
HEART SCORE: 5
EKG: NON SPECIFIC REPOLARIZATION DISTURBANCE

## 2020-07-13 ASSESSMENT — PAIN SCALES - GENERAL: PAINLEVEL_OUTOF10: 3

## 2020-07-13 ASSESSMENT — PAIN DESCRIPTION - PAIN TYPE: TYPE: ACUTE PAIN

## 2020-08-17 ENCOUNTER — ANCILLARY PROCEDURE (OUTPATIENT)
Dept: CARDIOLOGY | Age: 85
End: 2020-08-17
Attending: INTERNAL MEDICINE

## 2020-08-17 ENCOUNTER — ANCILLARY ORDERS (OUTPATIENT)
Dept: CARDIOLOGY | Age: 85
End: 2020-08-17

## 2020-08-17 DIAGNOSIS — Z95.810 ICD (IMPLANTABLE CARDIOVERTER-DEFIBRILLATOR) IN PLACE: ICD-10-CM

## 2020-08-17 PROCEDURE — X1114 CARDIAC DEVICE HOME CHECK - REMOTE UNSCHEDULED: HCPCS | Performed by: INTERNAL MEDICINE

## 2020-08-17 PROCEDURE — 93296 REM INTERROG EVL PM/IDS: CPT | Performed by: INTERNAL MEDICINE

## 2020-08-17 PROCEDURE — 93295 DEV INTERROG REMOTE 1/2/MLT: CPT | Performed by: INTERNAL MEDICINE

## 2021-01-01 ENCOUNTER — APPOINTMENT (OUTPATIENT)
Dept: CARDIOLOGY | Age: 86
DRG: 481 | End: 2021-01-01
Attending: SPECIALIST

## 2021-01-01 ENCOUNTER — APPOINTMENT (OUTPATIENT)
Dept: GENERAL RADIOLOGY | Age: 86
DRG: 291 | End: 2021-01-01
Attending: EMERGENCY MEDICINE

## 2021-01-01 ENCOUNTER — ANESTHESIA (OUTPATIENT)
Dept: SURGERY | Age: 86
DRG: 481 | End: 2021-01-01

## 2021-01-01 ENCOUNTER — ADVANCED DIRECTIVES (OUTPATIENT)
Dept: HEALTH INFORMATION MANAGEMENT | Age: 86
End: 2021-01-01

## 2021-01-01 ENCOUNTER — ANCILLARY PROCEDURE (OUTPATIENT)
Dept: CARDIOLOGY | Age: 86
End: 2021-01-01
Attending: INTERNAL MEDICINE

## 2021-01-01 ENCOUNTER — APPOINTMENT (OUTPATIENT)
Dept: ULTRASOUND IMAGING | Age: 86
DRG: 291 | End: 2021-01-01
Attending: EMERGENCY MEDICINE

## 2021-01-01 ENCOUNTER — APPOINTMENT (OUTPATIENT)
Dept: CT IMAGING | Age: 86
DRG: 481 | End: 2021-01-01
Attending: EMERGENCY MEDICINE

## 2021-01-01 ENCOUNTER — NURSING HOME VISIT (OUTPATIENT)
Dept: POST ACUTE CARE | Age: 86
End: 2021-01-01

## 2021-01-01 ENCOUNTER — TELEPHONE (OUTPATIENT)
Dept: SCHEDULING | Age: 86
End: 2021-01-01

## 2021-01-01 ENCOUNTER — APPOINTMENT (OUTPATIENT)
Dept: GENERAL RADIOLOGY | Age: 86
DRG: 481 | End: 2021-01-01
Attending: EMERGENCY MEDICINE

## 2021-01-01 ENCOUNTER — ANCILLARY ORDERS (OUTPATIENT)
Dept: CARDIOLOGY | Age: 86
End: 2021-01-01

## 2021-01-01 ENCOUNTER — TRANSCRIBE ORDERS (OUTPATIENT)
Dept: ONCOLOGY | Age: 86
End: 2021-01-01

## 2021-01-01 ENCOUNTER — APPOINTMENT (OUTPATIENT)
Dept: GENERAL RADIOLOGY | Age: 86
DRG: 481 | End: 2021-01-01
Attending: ORTHOPAEDIC SURGERY

## 2021-01-01 ENCOUNTER — LAB SERVICES (OUTPATIENT)
Dept: LAB | Age: 86
End: 2021-01-01

## 2021-01-01 ENCOUNTER — HOSPITAL ENCOUNTER (INPATIENT)
Age: 86
LOS: 4 days | Discharge: NURSING, REHAB OR LONG-TERM CARE | DRG: 481 | End: 2021-05-06
Attending: EMERGENCY MEDICINE | Admitting: INTERNAL MEDICINE

## 2021-01-01 ENCOUNTER — HOSPITAL ENCOUNTER (INPATIENT)
Age: 86
LOS: 2 days | Discharge: HOME HEALTH | DRG: 308 | End: 2021-06-17
Attending: EMERGENCY MEDICINE | Admitting: INTERNAL MEDICINE

## 2021-01-01 ENCOUNTER — LAB REQUISITION (OUTPATIENT)
Dept: LAB | Age: 86
End: 2021-01-01

## 2021-01-01 ENCOUNTER — APPOINTMENT (OUTPATIENT)
Dept: GENERAL RADIOLOGY | Age: 86
DRG: 308 | End: 2021-01-01
Attending: EMERGENCY MEDICINE

## 2021-01-01 ENCOUNTER — HOSPITAL ENCOUNTER (INPATIENT)
Age: 86
LOS: 1 days | DRG: 291 | End: 2021-09-28
Attending: EMERGENCY MEDICINE | Admitting: INTERNAL MEDICINE

## 2021-01-01 ENCOUNTER — APPOINTMENT (OUTPATIENT)
Dept: CT IMAGING | Age: 86
DRG: 291 | End: 2021-01-01
Attending: EMERGENCY MEDICINE

## 2021-01-01 ENCOUNTER — ANESTHESIA EVENT (OUTPATIENT)
Dept: SURGERY | Age: 86
DRG: 481 | End: 2021-01-01

## 2021-01-01 VITALS
BODY MASS INDEX: 28.51 KG/M2 | DIASTOLIC BLOOD PRESSURE: 71 MMHG | OXYGEN SATURATION: 97 % | SYSTOLIC BLOOD PRESSURE: 98 MMHG | HEART RATE: 90 BPM | RESPIRATION RATE: 18 BRPM | HEIGHT: 61 IN | TEMPERATURE: 96.8 F | WEIGHT: 151.01 LBS

## 2021-01-01 VITALS
WEIGHT: 121.25 LBS | OXYGEN SATURATION: 94 % | HEIGHT: 62 IN | RESPIRATION RATE: 16 BRPM | TEMPERATURE: 96.8 F | DIASTOLIC BLOOD PRESSURE: 68 MMHG | BODY MASS INDEX: 22.31 KG/M2 | SYSTOLIC BLOOD PRESSURE: 135 MMHG | HEART RATE: 68 BPM

## 2021-01-01 VITALS
OXYGEN SATURATION: 80 % | DIASTOLIC BLOOD PRESSURE: 36 MMHG | WEIGHT: 117.73 LBS | RESPIRATION RATE: 3 BRPM | SYSTOLIC BLOOD PRESSURE: 39 MMHG | HEART RATE: 55 BPM | TEMPERATURE: 95.2 F | HEIGHT: 65 IN | BODY MASS INDEX: 19.61 KG/M2

## 2021-01-01 DIAGNOSIS — R53.81 DEBILITY: ICD-10-CM

## 2021-01-01 DIAGNOSIS — E08.00 DIABETES MELLITUS DUE TO UNDERLYING CONDITION WITH HYPEROSMOLARITY WITHOUT COMA, WITHOUT LONG-TERM CURRENT USE OF INSULIN (CMD): ICD-10-CM

## 2021-01-01 DIAGNOSIS — E05.90 THYROTOXICOSIS, UNSPECIFIED WITHOUT THYROTOXIC CRISIS OR STORM: ICD-10-CM

## 2021-01-01 DIAGNOSIS — F03.90 DEMENTIA WITHOUT BEHAVIORAL DISTURBANCE, UNSPECIFIED DEMENTIA TYPE: ICD-10-CM

## 2021-01-01 DIAGNOSIS — N18.2 CHRONIC KIDNEY DISEASE (CKD), STAGE II (MILD): ICD-10-CM

## 2021-01-01 DIAGNOSIS — Z95.810 ICD (IMPLANTABLE CARDIOVERTER-DEFIBRILLATOR) IN PLACE: ICD-10-CM

## 2021-01-01 DIAGNOSIS — Z79.01 ANTICOAGULATED: ICD-10-CM

## 2021-01-01 DIAGNOSIS — E78.2 MIXED HYPERLIPIDEMIA: Primary | ICD-10-CM

## 2021-01-01 DIAGNOSIS — R52 PAIN: ICD-10-CM

## 2021-01-01 DIAGNOSIS — I42.0 DILATED CARDIOMYOPATHY (CMD): ICD-10-CM

## 2021-01-01 DIAGNOSIS — E03.9 HYPOTHYROIDISM, UNSPECIFIED TYPE: ICD-10-CM

## 2021-01-01 DIAGNOSIS — R65.20 SEVERE SEPSIS (CMD): Primary | ICD-10-CM

## 2021-01-01 DIAGNOSIS — I10 HYPERTENSION, UNSPECIFIED TYPE: ICD-10-CM

## 2021-01-01 DIAGNOSIS — I48.20 ATRIAL FIBRILLATION, CHRONIC (CMD): ICD-10-CM
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
## 2021-01-01 DIAGNOSIS — Z98.890 STATUS POST HIP SURGERY: Primary | ICD-10-CM

## 2021-01-01 DIAGNOSIS — R06.09 DYSPNEA ON EXERTION: ICD-10-CM

## 2021-01-01 DIAGNOSIS — I10 ESSENTIAL (PRIMARY) HYPERTENSION: ICD-10-CM

## 2021-01-01 DIAGNOSIS — E86.0 DEHYDRATION: ICD-10-CM

## 2021-01-01 DIAGNOSIS — I42.0 DILATED CARDIOMYOPATHY (CMD): Primary | ICD-10-CM

## 2021-01-01 DIAGNOSIS — Z98.890 STATUS POST HIP SURGERY: ICD-10-CM

## 2021-01-01 DIAGNOSIS — A41.9 SEVERE SEPSIS (CMD): Primary | ICD-10-CM

## 2021-01-01 DIAGNOSIS — R53.1 GENERALIZED WEAKNESS: ICD-10-CM

## 2021-01-01 DIAGNOSIS — E11.9 TYPE 2 DIABETES MELLITUS WITHOUT COMPLICATIONS (CMD): ICD-10-CM

## 2021-01-01 DIAGNOSIS — E05.90 PRETIBIAL MYXEDEMA: ICD-10-CM

## 2021-01-01 DIAGNOSIS — I50.9 HEART FAILURE, UNSPECIFIED (CMD): ICD-10-CM

## 2021-01-01 DIAGNOSIS — R57.0 CARDIOGENIC SHOCK (CMD): ICD-10-CM

## 2021-01-01 DIAGNOSIS — R60.0 EDEMA OF LEFT UPPER ARM: ICD-10-CM

## 2021-01-01 DIAGNOSIS — S72.002A CLOSED FRACTURE OF LEFT HIP, INITIAL ENCOUNTER (CMD): Primary | ICD-10-CM

## 2021-01-01 DIAGNOSIS — E78.2 MIXED HYPERLIPIDEMIA: ICD-10-CM

## 2021-01-01 DIAGNOSIS — I48.91 ATRIAL FIBRILLATION WITH RVR (CMD): Primary | ICD-10-CM

## 2021-01-01 LAB
ABO + RH BLD: NORMAL
ALBUMIN SERPL-MCNC: 2.6 G/DL (ref 3.6–5.1)
ALBUMIN SERPL-MCNC: 3 G/DL (ref 3.6–5.1)
ALBUMIN SERPL-MCNC: 3 G/DL (ref 3.6–5.1)
ALBUMIN SERPL-MCNC: 3.4 G/DL (ref 3.6–5.1)
ALBUMIN SERPL-MCNC: 3.6 G/DL (ref 3.6–5.1)
ALBUMIN/GLOB SERPL: 0.9 {RATIO} (ref 1–2.4)
ALBUMIN/GLOB SERPL: 0.9 {RATIO} (ref 1–2.4)
ALBUMIN/GLOB SERPL: 1 {RATIO} (ref 1–2.4)
ALBUMIN/GLOB SERPL: 1.1 {RATIO} (ref 1–2.4)
ALBUMIN/GLOB SERPL: 1.1 {RATIO} (ref 1–2.4)
ALP SERPL-CCNC: 131 UNITS/L (ref 45–117)
ALP SERPL-CCNC: 62 UNITS/L (ref 45–117)
ALP SERPL-CCNC: 88 UNITS/L (ref 45–117)
ALP SERPL-CCNC: 94 UNITS/L (ref 45–117)
ALP SERPL-CCNC: 99 UNITS/L (ref 45–117)
ALT SERPL-CCNC: 16 UNITS/L
ALT SERPL-CCNC: 17 UNITS/L
ALT SERPL-CCNC: 24 UNITS/L
ALT SERPL-CCNC: 33 UNITS/L
ALT SERPL-CCNC: 39 UNITS/L
ANION GAP SERPL CALC-SCNC: 10 MMOL/L (ref 10–20)
ANION GAP SERPL CALC-SCNC: 11 MMOL/L (ref 10–20)
ANION GAP SERPL CALC-SCNC: 12 MMOL/L (ref 10–20)
ANION GAP SERPL CALC-SCNC: 13 MMOL/L (ref 10–20)
ANION GAP SERPL CALC-SCNC: 13 MMOL/L (ref 10–20)
ANION GAP SERPL CALC-SCNC: 14 MMOL/L (ref 10–20)
ANION GAP SERPL CALC-SCNC: 8 MMOL/L (ref 10–20)
APPEARANCE UR: CLEAR
APPEARANCE UR: CLEAR
APTT PPP: 36 SEC (ref 22–30)
AST SERPL-CCNC: 13 UNITS/L
AST SERPL-CCNC: 18 UNITS/L
AST SERPL-CCNC: 23 UNITS/L
AST SERPL-CCNC: 23 UNITS/L
AST SERPL-CCNC: 24 UNITS/L
ATRIAL RATE (BPM): 32
ATRIAL RATE (BPM): 59
ATRIAL RATE (BPM): 79
BACTERIA #/AREA URNS HPF: ABNORMAL /HPF
BACTERIA UR CULT: ABNORMAL
BASOPHILS # BLD: 0 K/MCL (ref 0–0.3)
BASOPHILS # BLD: 0.1 K/MCL (ref 0–0.3)
BASOPHILS NFR BLD: 0 %
BASOPHILS NFR BLD: 0 %
BASOPHILS NFR BLD: 1 %
BASOPHILS NFR BLD: 1 %
BDY SITE: ABNORMAL
BILIRUB SERPL-MCNC: 0.5 MG/DL (ref 0.2–1)
BILIRUB SERPL-MCNC: 0.9 MG/DL (ref 0.2–1)
BILIRUB SERPL-MCNC: 1.4 MG/DL (ref 0.2–1)
BILIRUB SERPL-MCNC: 1.4 MG/DL (ref 0.2–1)
BILIRUB SERPL-MCNC: 2.3 MG/DL (ref 0.2–1)
BILIRUB UR QL STRIP: NEGATIVE
BILIRUB UR QL STRIP: NEGATIVE
BLD GP AB SCN SERPL QL GEL: NEGATIVE
BODY TEMPERATURE: 36 DEGREES
BUN SERPL-MCNC: 15 MG/DL (ref 6–20)
BUN SERPL-MCNC: 22 MG/DL (ref 6–20)
BUN SERPL-MCNC: 23 MG/DL (ref 6–20)
BUN SERPL-MCNC: 36 MG/DL (ref 6–20)
BUN SERPL-MCNC: 37 MG/DL (ref 6–20)
BUN SERPL-MCNC: 38 MG/DL (ref 6–20)
BUN SERPL-MCNC: 39 MG/DL (ref 6–20)
BUN SERPL-MCNC: 42 MG/DL (ref 6–20)
BUN SERPL-MCNC: 43 MG/DL (ref 6–20)
BUN/CREAT SERPL: 14 (ref 7–25)
BUN/CREAT SERPL: 19 (ref 7–25)
BUN/CREAT SERPL: 19 (ref 7–25)
BUN/CREAT SERPL: 20 (ref 7–25)
BUN/CREAT SERPL: 23 (ref 7–25)
BUN/CREAT SERPL: 25 (ref 7–25)
BUN/CREAT SERPL: 28 (ref 7–25)
BUN/CREAT SERPL: 29 (ref 7–25)
BUN/CREAT SERPL: 35 (ref 7–25)
BURR CELLS BLD QL SMEAR: ABNORMAL
BURR CELLS BLD QL SMEAR: ABNORMAL
CA-I BLD-SCNC: 0.98 MMOL/L (ref 1.15–1.29)
CALCIUM SERPL-MCNC: 10.2 MG/DL (ref 8.4–10.2)
CALCIUM SERPL-MCNC: 8.3 MG/DL (ref 8.4–10.2)
CALCIUM SERPL-MCNC: 8.6 MG/DL (ref 8.4–10.2)
CALCIUM SERPL-MCNC: 8.7 MG/DL (ref 8.4–10.2)
CALCIUM SERPL-MCNC: 8.8 MG/DL (ref 8.4–10.2)
CALCIUM SERPL-MCNC: 8.8 MG/DL (ref 8.4–10.2)
CALCIUM SERPL-MCNC: 9.2 MG/DL (ref 8.4–10.2)
CALCIUM SERPL-MCNC: 9.5 MG/DL (ref 8.4–10.2)
CALCIUM SERPL-MCNC: 9.8 MG/DL (ref 8.4–10.2)
CHLORIDE BLD-SCNC: 102 MMOL/L (ref 98–107)
CHLORIDE SERPL-SCNC: 100 MMOL/L (ref 98–107)
CHLORIDE SERPL-SCNC: 102 MMOL/L (ref 98–107)
CHLORIDE SERPL-SCNC: 103 MMOL/L (ref 98–107)
CHLORIDE SERPL-SCNC: 105 MMOL/L (ref 98–107)
CHLORIDE SERPL-SCNC: 105 MMOL/L (ref 98–107)
CHLORIDE SERPL-SCNC: 107 MMOL/L (ref 98–107)
CHLORIDE SERPL-SCNC: 108 MMOL/L (ref 98–107)
CHLORIDE SERPL-SCNC: 109 MMOL/L (ref 98–107)
CHLORIDE SERPL-SCNC: 110 MMOL/L (ref 98–107)
CHOLEST SERPL-MCNC: 100 MG/DL
CHOLEST/HDLC SERPL: 2.9 {RATIO}
CK SERPL-CCNC: 26 UNITS/L (ref 26–192)
CO2 SERPL-SCNC: 19 MMOL/L (ref 21–32)
CO2 SERPL-SCNC: 20 MMOL/L (ref 21–32)
CO2 SERPL-SCNC: 20 MMOL/L (ref 21–32)
CO2 SERPL-SCNC: 22 MMOL/L (ref 21–32)
CO2 SERPL-SCNC: 22 MMOL/L (ref 21–32)
CO2 SERPL-SCNC: 25 MMOL/L (ref 21–32)
CO2 SERPL-SCNC: 28 MMOL/L (ref 21–32)
CO2 SERPL-SCNC: 28 MMOL/L (ref 21–32)
CO2 SERPL-SCNC: 29 MMOL/L (ref 21–32)
COHGB MFR BLDV: 1.4 % (ref 1.5–15)
COLOR UR: NORMAL
COLOR UR: YELLOW
CREAT SERPL-MCNC: 1.08 MG/DL (ref 0.51–0.95)
CREAT SERPL-MCNC: 1.16 MG/DL (ref 0.51–0.95)
CREAT SERPL-MCNC: 1.18 MG/DL (ref 0.51–0.95)
CREAT SERPL-MCNC: 1.24 MG/DL (ref 0.51–0.95)
CREAT SERPL-MCNC: 1.26 MG/DL (ref 0.51–0.95)
CREAT SERPL-MCNC: 1.45 MG/DL (ref 0.51–0.95)
CREAT SERPL-MCNC: 1.5 MG/DL (ref 0.51–0.95)
CREAT SERPL-MCNC: 1.66 MG/DL (ref 0.51–0.95)
CREAT SERPL-MCNC: 1.96 MG/DL (ref 0.51–0.95)
DEPRECATED RDW RBC: 44.3 FL (ref 39–50)
DEPRECATED RDW RBC: 49.1 FL (ref 39–50)
DEPRECATED RDW RBC: 49.6 FL (ref 39–50)
DEPRECATED RDW RBC: 49.8 FL (ref 39–50)
DEPRECATED RDW RBC: 57.8 FL (ref 39–50)
DEPRECATED RDW RBC: 59.9 FL (ref 39–50)
EOSINOPHIL # BLD: 0 K/MCL (ref 0–0.5)
EOSINOPHIL # BLD: 0 K/MCL (ref 0–0.5)
EOSINOPHIL # BLD: 0.1 K/MCL (ref 0–0.5)
EOSINOPHIL # BLD: 0.6 K/MCL (ref 0–0.5)
EOSINOPHIL NFR BLD: 0 %
EOSINOPHIL NFR BLD: 0 %
EOSINOPHIL NFR BLD: 2 %
EOSINOPHIL NFR BLD: 5 %
ERYTHROCYTE [DISTWIDTH] IN BLOOD: 15.3 % (ref 11–15)
ERYTHROCYTE [DISTWIDTH] IN BLOOD: 16.4 % (ref 11–15)
ERYTHROCYTE [DISTWIDTH] IN BLOOD: 16.6 % (ref 11–15)
ERYTHROCYTE [DISTWIDTH] IN BLOOD: 16.8 % (ref 11–15)
ERYTHROCYTE [DISTWIDTH] IN BLOOD: 18.6 % (ref 11–15)
ERYTHROCYTE [DISTWIDTH] IN BLOOD: 24.5 % (ref 11–15)
FASTING DURATION TIME PATIENT: 12 HOURS
FASTING DURATION TIME PATIENT: 12 HOURS
FASTING DURATION TIME PATIENT: ABNORMAL H
FIO2 ON VENT: 21 %
GFR SERPLBLD BASED ON 1.73 SQ M-ARVRAT: 23 ML/MIN/1.73M2
GFR SERPLBLD BASED ON 1.73 SQ M-ARVRAT: 28 ML/MIN/1.73M2
GFR SERPLBLD BASED ON 1.73 SQ M-ARVRAT: 32 ML/MIN/1.73M2
GFR SERPLBLD BASED ON 1.73 SQ M-ARVRAT: 33 ML/MIN/1.73M2
GFR SERPLBLD BASED ON 1.73 SQ M-ARVRAT: 39 ML/MIN
GFR SERPLBLD BASED ON 1.73 SQ M-ARVRAT: 40 ML/MIN
GFR SERPLBLD BASED ON 1.73 SQ M-ARVRAT: 42 ML/MIN/1.73M2
GFR SERPLBLD BASED ON 1.73 SQ M-ARVRAT: 43 ML/MIN/1.73M2
GFR SERPLBLD BASED ON 1.73 SQ M-ARVRAT: 47 ML/MIN/1.73M2
GLOBULIN SER-MCNC: 2.8 G/DL (ref 2–4)
GLOBULIN SER-MCNC: 2.8 G/DL (ref 2–4)
GLOBULIN SER-MCNC: 3.1 G/DL (ref 2–4)
GLOBULIN SER-MCNC: 3.3 G/DL (ref 2–4)
GLOBULIN SER-MCNC: 3.7 G/DL (ref 2–4)
GLUCOSE BLD-MCNC: 168 MG/DL (ref 65–99)
GLUCOSE BLDC GLUCOMTR-MCNC: 139 MG/DL (ref 70–99)
GLUCOSE SERPL-MCNC: 105 MG/DL (ref 70–99)
GLUCOSE SERPL-MCNC: 113 MG/DL (ref 65–99)
GLUCOSE SERPL-MCNC: 117 MG/DL (ref 65–99)
GLUCOSE SERPL-MCNC: 129 MG/DL (ref 65–99)
GLUCOSE SERPL-MCNC: 140 MG/DL (ref 65–99)
GLUCOSE SERPL-MCNC: 146 MG/DL (ref 65–99)
GLUCOSE SERPL-MCNC: 151 MG/DL (ref 70–99)
GLUCOSE SERPL-MCNC: 152 MG/DL (ref 65–99)
GLUCOSE SERPL-MCNC: 87 MG/DL (ref 65–99)
GLUCOSE UR STRIP-MCNC: NEGATIVE MG/DL
GLUCOSE UR STRIP-MCNC: NEGATIVE MG/DL
HBA1C MFR BLD: 7.4 % (ref 4.5–5.6)
HCT VFR BLD CALC: 33.2 % (ref 36–46.5)
HCT VFR BLD CALC: 33.6 % (ref 36–46.5)
HCT VFR BLD CALC: 33.6 % (ref 36–46.5)
HCT VFR BLD CALC: 37.9 % (ref 36–46.5)
HCT VFR BLD CALC: 39 % (ref 36–46.5)
HCT VFR BLD CALC: 39.6 % (ref 36–46.5)
HCT VFR BLD CALC: 41.6 % (ref 36–46.5)
HDLC SERPL-MCNC: 35 MG/DL
HGB BLD-MCNC: 10.5 G/DL (ref 12–15.5)
HGB BLD-MCNC: 10.8 G/DL (ref 12–15.5)
HGB BLD-MCNC: 10.9 G/DL (ref 12–15.5)
HGB BLD-MCNC: 11.5 G/DL (ref 12–15.5)
HGB BLD-MCNC: 12.8 G/DL (ref 12–15.5)
HGB BLD-MCNC: 13 G/DL (ref 12–15.5)
HGB BLD-MCNC: 13 G/DL (ref 12–15.5)
HGB UR QL STRIP: NEGATIVE
HGB UR QL STRIP: NEGATIVE
HOROWITZ INDEX BLDA+IHG-RTO: 633 MM[HG] (ref 300–500)
HYALINE CASTS #/AREA URNS LPF: ABNORMAL /LPF
IMM GRANULOCYTES # BLD AUTO: 0 K/MCL (ref 0–0.2)
IMM GRANULOCYTES # BLD: 1 %
INR PPP: 1.5
KETONES UR STRIP-MCNC: NEGATIVE MG/DL
KETONES UR STRIP-MCNC: NEGATIVE MG/DL
LACTATE BLDA-SCNC: 2.8 MMOL/L
LACTATE BLDV-SCNC: 2.6 MMOL/L (ref 0–2)
LACTATE BLDV-SCNC: 4.7 MMOL/L (ref 0–2)
LDLC SERPL CALC-MCNC: 29 MG/DL
LEUKOCYTE ESTERASE UR QL STRIP: ABNORMAL
LEUKOCYTE ESTERASE UR QL STRIP: NEGATIVE
LYMPHOCYTES # BLD: 2 K/MCL (ref 1–4)
LYMPHOCYTES # BLD: 3.3 K/MCL (ref 1–4)
LYMPHOCYTES # BLD: 3.8 K/MCL (ref 1–4)
LYMPHOCYTES # BLD: 6.8 K/MCL (ref 1–4)
LYMPHOCYTES NFR BLD: 25 %
LYMPHOCYTES NFR BLD: 36 %
LYMPHOCYTES NFR BLD: 41 %
LYMPHOCYTES NFR BLD: 57 %
MAGNESIUM SERPL-MCNC: 2 MG/DL (ref 1.7–2.4)
MAGNESIUM SERPL-MCNC: 2.2 MG/DL (ref 1.7–2.4)
MAGNESIUM SERPL-MCNC: 2.4 MG/DL (ref 1.7–2.4)
MCH RBC QN AUTO: 23 PG (ref 26–34)
MCH RBC QN AUTO: 24.3 PG (ref 26–34)
MCH RBC QN AUTO: 25.7 PG (ref 26–34)
MCH RBC QN AUTO: 26.7 PG (ref 26–34)
MCH RBC QN AUTO: 26.7 PG (ref 26–34)
MCH RBC QN AUTO: 27 PG (ref 26–34)
MCHC RBC AUTO-ENTMCNC: 30.3 G/DL (ref 32–36.5)
MCHC RBC AUTO-ENTMCNC: 31.3 G/DL (ref 32–36.5)
MCHC RBC AUTO-ENTMCNC: 31.3 G/DL (ref 32–36.5)
MCHC RBC AUTO-ENTMCNC: 32.3 G/DL (ref 32–36.5)
MCHC RBC AUTO-ENTMCNC: 32.4 G/DL (ref 32–36.5)
MCHC RBC AUTO-ENTMCNC: 32.5 G/DL (ref 32–36.5)
MCV RBC AUTO: 71.2 FL (ref 78–100)
MCV RBC AUTO: 80.1 FL (ref 78–100)
MCV RBC AUTO: 82.2 FL (ref 78–100)
MCV RBC AUTO: 82.2 FL (ref 78–100)
MCV RBC AUTO: 82.4 FL (ref 78–100)
MCV RBC AUTO: 86.4 FL (ref 78–100)
METHGB MFR BLDMV: 0.9 %
MONOCYTES # BLD: 0.2 K/MCL (ref 0.3–0.9)
MONOCYTES # BLD: 0.2 K/MCL (ref 0.3–0.9)
MONOCYTES # BLD: 0.3 K/MCL (ref 0.3–0.9)
MONOCYTES # BLD: 0.5 K/MCL (ref 0.3–0.9)
MONOCYTES NFR BLD: 2 %
MONOCYTES NFR BLD: 3 %
MONOCYTES NFR BLD: 4 %
MONOCYTES NFR BLD: 5 %
MUCOUS THREADS URNS QL MICRO: PRESENT
NEUTROPHILS # BLD: 3.6 K/MCL (ref 1.8–7.7)
NEUTROPHILS # BLD: 3.7 K/MCL (ref 1.8–7.7)
NEUTROPHILS # BLD: 5.6 K/MCL (ref 1.8–7.7)
NEUTROPHILS # BLD: 6.2 K/MCL (ref 1.8–7.7)
NEUTROPHILS NFR BLD: 69 %
NEUTS SEG NFR BLD: 32 %
NEUTS SEG NFR BLD: 50 %
NEUTS SEG NFR BLD: 59 %
NITRITE UR QL STRIP: NEGATIVE
NITRITE UR QL STRIP: NEGATIVE
NONHDLC SERPL-MCNC: 65 MG/DL
NRBC BLD MANUAL-RTO: 0 /100 WBC
NT-PROBNP SERPL-MCNC: ABNORMAL PG/ML
NT-PROBNP SERPL-MCNC: ABNORMAL PG/ML
OXYHGB MFR BLDA: 96.9 % (ref 94–98)
PCO2 BLDA: <19 MM HG (ref 32–45)
PH BLDA: 7.43 UNITS (ref 7.35–7.45)
PH UR STRIP: 5 [PH] (ref 5–7)
PH UR STRIP: 5 [PH] (ref 5–7)
PHOSPHATE SERPL-MCNC: 4.2 MG/DL (ref 2.4–4.7)
PLAT MORPH BLD: NORMAL
PLATELET # BLD AUTO: 107 K/MCL (ref 140–450)
PLATELET # BLD AUTO: 115 K/MCL (ref 140–450)
PLATELET # BLD AUTO: 121 K/MCL (ref 140–450)
PLATELET # BLD AUTO: 133 K/MCL (ref 140–450)
PLATELET # BLD AUTO: 143 K/MCL (ref 140–450)
PLATELET # BLD AUTO: 159 K/MCL (ref 140–450)
PO2 BLDA: 127 MM HG (ref 83–108)
POTASSIUM BLD-SCNC: 3.7 MMOL/L (ref 3.4–5.1)
POTASSIUM SERPL-SCNC: 2.8 MMOL/L (ref 3.4–5.1)
POTASSIUM SERPL-SCNC: 3 MMOL/L (ref 3.4–5.1)
POTASSIUM SERPL-SCNC: 3.5 MMOL/L (ref 3.4–5.1)
POTASSIUM SERPL-SCNC: 3.6 MMOL/L (ref 3.4–5.1)
POTASSIUM SERPL-SCNC: 3.6 MMOL/L (ref 3.4–5.1)
POTASSIUM SERPL-SCNC: 3.7 MMOL/L (ref 3.4–5.1)
POTASSIUM SERPL-SCNC: 4.3 MMOL/L (ref 3.4–5.1)
POTASSIUM SERPL-SCNC: 4.4 MMOL/L (ref 3.4–5.1)
POTASSIUM SERPL-SCNC: 4.6 MMOL/L (ref 3.4–5.1)
PROCALCITONIN SERPL IA-MCNC: 0.14 NG/ML
PROT SERPL-MCNC: 5.4 G/DL (ref 6.4–8.2)
PROT SERPL-MCNC: 5.8 G/DL (ref 6.4–8.2)
PROT SERPL-MCNC: 6.3 G/DL (ref 6.4–8.2)
PROT SERPL-MCNC: 6.5 G/DL (ref 6.4–8.2)
PROT SERPL-MCNC: 7.3 G/DL (ref 6.4–8.2)
PROT UR STRIP-MCNC: NEGATIVE MG/DL
PROT UR STRIP-MCNC: NEGATIVE MG/DL
PROTHROMBIN TIME: 15.3 SEC (ref 9.7–11.8)
QRS-INTERVAL (MSEC): 142
QRS-INTERVAL (MSEC): 158
QRS-INTERVAL (MSEC): 170
QT-INTERVAL (MSEC): 362
QT-INTERVAL (MSEC): 440
QT-INTERVAL (MSEC): 442
QTC: 454
QTC: 547
QTC: 576
R AXIS (DEGREES): -44
R AXIS (DEGREES): 122
R AXIS (DEGREES): 29
RAINBOW EXTRA TUBES HOLD SPECIMEN: NORMAL
RBC # BLD: 3.89 MIL/MCL (ref 4–5.2)
RBC # BLD: 4.04 MIL/MCL (ref 4–5.2)
RBC # BLD: 4.09 MIL/MCL (ref 4–5.2)
RBC # BLD: 4.73 MIL/MCL (ref 4–5.2)
RBC # BLD: 5.05 MIL/MCL (ref 4–5.2)
RBC # BLD: 5.56 MIL/MCL (ref 4–5.2)
RBC #/AREA URNS HPF: ABNORMAL /HPF
RBC MORPH BLD: NORMAL
REPORT TEXT: NORMAL
SAO2 % BLDA: 18 % (ref 15–23)
SAO2 % BLDA: 99 % (ref 95–99)
SARS-COV-2 RNA RESP QL NAA+PROBE: NOT DETECTED
SARS-COV-2 RNA RESP QL NAA+PROBE: NOT DETECTED
SERVICE CMNT-IMP: NORMAL
SODIUM BLD-SCNC: 128 MMOL/L (ref 135–145)
SODIUM SERPL-SCNC: 132 MMOL/L (ref 135–145)
SODIUM SERPL-SCNC: 132 MMOL/L (ref 135–145)
SODIUM SERPL-SCNC: 134 MMOL/L (ref 135–145)
SODIUM SERPL-SCNC: 135 MMOL/L (ref 135–145)
SODIUM SERPL-SCNC: 136 MMOL/L (ref 135–145)
SODIUM SERPL-SCNC: 137 MMOL/L (ref 135–145)
SODIUM SERPL-SCNC: 138 MMOL/L (ref 135–145)
SODIUM SERPL-SCNC: 140 MMOL/L (ref 135–145)
SODIUM SERPL-SCNC: 140 MMOL/L (ref 135–145)
SP GR UR STRIP: 1.01 (ref 1–1.03)
SP GR UR STRIP: 1.01 (ref 1–1.03)
SQUAMOUS #/AREA URNS HPF: ABNORMAL /HPF
T AXIS (DEGREES): -166
T AXIS (DEGREES): -51
T AXIS (DEGREES): 118
TRANS CELLS #/AREA URNS HPF: ABNORMAL /HPF
TRIGL SERPL-MCNC: 181 MG/DL
TROPONIN I SERPL HS-MCNC: 0.1 NG/ML
TROPONIN I SERPL HS-MCNC: <0.02 NG/ML
TSH SERPL-ACNC: 0.43 MCUNITS/ML (ref 0.35–5)
TSH SERPL-ACNC: 2.09 MCUNITS/ML (ref 0.35–5)
TYPE AND SCREEN EXPIRATION DATE: NORMAL
UROBILINOGEN UR STRIP-MCNC: 0.2 MG/DL
UROBILINOGEN UR STRIP-MCNC: 1 MG/DL
VARIANT LYMPHS NFR BLD: 3 % (ref 0–5)
VARIANT LYMPHS NFR BLD: 4 % (ref 0–5)
VENTRICULAR RATE EKG/MIN (BPM): 102
VENTRICULAR RATE EKG/MIN (BPM): 137
VENTRICULAR RATE EKG/MIN (BPM): 64
WBC # BLD: 10.5 K/MCL (ref 4.2–11)
WBC # BLD: 11.3 K/MCL (ref 4.2–11)
WBC # BLD: 7.4 K/MCL (ref 4.2–11)
WBC # BLD: 8 K/MCL (ref 4.2–11)
WBC # BLD: 8.7 K/MCL (ref 4.2–11)
WBC # BLD: 9.2 K/MCL (ref 4.2–11)
WBC #/AREA URNS HPF: ABNORMAL /HPF
WBC MORPH BLD: NORMAL
WBC MORPH BLD: NORMAL
WBC TOXIC VACUOLES BLD QL SMEAR: PRESENT

## 2021-01-01 PROCEDURE — 99310 SBSQ NF CARE HIGH MDM 45: CPT | Performed by: CLINICAL NURSE SPECIALIST

## 2021-01-01 PROCEDURE — 10002800 HB RX 250 W HCPCS: Performed by: INTERNAL MEDICINE

## 2021-01-01 PROCEDURE — 83874 ASSAY OF MYOGLOBIN: CPT | Performed by: EMERGENCY MEDICINE

## 2021-01-01 PROCEDURE — 10002803 HB RX 637: Performed by: NURSE PRACTITIONER

## 2021-01-01 PROCEDURE — 10002807 HB RX 258: Performed by: EMERGENCY MEDICINE

## 2021-01-01 PROCEDURE — 1160F RVW MEDS BY RX/DR IN RCRD: CPT | Performed by: CLINICAL NURSE SPECIALIST

## 2021-01-01 PROCEDURE — 84145 PROCALCITONIN (PCT): CPT | Performed by: EMERGENCY MEDICINE

## 2021-01-01 PROCEDURE — 87635 SARS-COV-2 COVID-19 AMP PRB: CPT | Performed by: EMERGENCY MEDICINE

## 2021-01-01 PROCEDURE — 10002801 HB RX 250 W/O HCPCS: Performed by: INTERNAL MEDICINE

## 2021-01-01 PROCEDURE — 84484 ASSAY OF TROPONIN QUANT: CPT | Performed by: EMERGENCY MEDICINE

## 2021-01-01 PROCEDURE — 72131 CT LUMBAR SPINE W/O DYE: CPT

## 2021-01-01 PROCEDURE — 97116 GAIT TRAINING THERAPY: CPT

## 2021-01-01 PROCEDURE — 80053 COMPREHEN METABOLIC PANEL: CPT | Performed by: CLINICAL MEDICAL LABORATORY

## 2021-01-01 PROCEDURE — 10002800 HB RX 250 W HCPCS: Performed by: EMERGENCY MEDICINE

## 2021-01-01 PROCEDURE — 06HY33Z INSERTION OF INFUSION DEVICE INTO LOWER VEIN, PERCUTANEOUS APPROACH: ICD-10-PCS | Performed by: INTERNAL MEDICINE

## 2021-01-01 PROCEDURE — 10006031 HB ROOM CHARGE TELEMETRY

## 2021-01-01 PROCEDURE — 10004281 HB COUNTER-STAFF TIME PER 15 MIN

## 2021-01-01 PROCEDURE — 1170F FXNL STATUS ASSESSED: CPT | Performed by: CLINICAL NURSE SPECIALIST

## 2021-01-01 PROCEDURE — 85610 PROTHROMBIN TIME: CPT | Performed by: EMERGENCY MEDICINE

## 2021-01-01 PROCEDURE — 10004651 HB RX, NO CHARGE ITEM: Performed by: EMERGENCY MEDICINE

## 2021-01-01 PROCEDURE — 93296 REM INTERROG EVL PM/IDS: CPT | Performed by: INTERNAL MEDICINE

## 2021-01-01 PROCEDURE — 10002801 HB RX 250 W/O HCPCS: Performed by: NURSE PRACTITIONER

## 2021-01-01 PROCEDURE — 84100 ASSAY OF PHOSPHORUS: CPT | Performed by: CLINICAL MEDICAL LABORATORY

## 2021-01-01 PROCEDURE — 83735 ASSAY OF MAGNESIUM: CPT | Performed by: CLINICAL MEDICAL LABORATORY

## 2021-01-01 PROCEDURE — 13000003 HB ANESTHESIA  GENERAL EA ADD MINUTE: Performed by: ORTHOPAEDIC SURGERY

## 2021-01-01 PROCEDURE — 93971 EXTREMITY STUDY: CPT

## 2021-01-01 PROCEDURE — 99285 EMERGENCY DEPT VISIT HI MDM: CPT

## 2021-01-01 PROCEDURE — G1004 CDSM NDSC: HCPCS

## 2021-01-01 PROCEDURE — 85018 HEMOGLOBIN: CPT

## 2021-01-01 PROCEDURE — 82805 BLOOD GASES W/O2 SATURATION: CPT

## 2021-01-01 PROCEDURE — 97166 OT EVAL MOD COMPLEX 45 MIN: CPT

## 2021-01-01 PROCEDURE — 80053 COMPREHEN METABOLIC PANEL: CPT | Performed by: EMERGENCY MEDICINE

## 2021-01-01 PROCEDURE — 85027 COMPLETE CBC AUTOMATED: CPT | Performed by: EMERGENCY MEDICINE

## 2021-01-01 PROCEDURE — 13000116 HB ORTHO COMPLEX CASE S/U + 1ST 15 MIN: Performed by: ORTHOPAEDIC SURGERY

## 2021-01-01 PROCEDURE — 10004651 HB RX, NO CHARGE ITEM: Performed by: NURSE PRACTITIONER

## 2021-01-01 PROCEDURE — 1158F ADVNC CARE PLAN TLK DOCD: CPT | Performed by: CLINICAL NURSE SPECIALIST

## 2021-01-01 PROCEDURE — 10002803 HB RX 637: Performed by: INTERNAL MEDICINE

## 2021-01-01 PROCEDURE — 93005 ELECTROCARDIOGRAM TRACING: CPT | Performed by: EMERGENCY MEDICINE

## 2021-01-01 PROCEDURE — 10002803 HB RX 637: Performed by: EMERGENCY MEDICINE

## 2021-01-01 PROCEDURE — X1114 CARDIAC DEVICE HOME CHECK - REMOTE UNSCHEDULED: HCPCS | Performed by: INTERNAL MEDICINE

## 2021-01-01 PROCEDURE — 97530 THERAPEUTIC ACTIVITIES: CPT

## 2021-01-01 PROCEDURE — 84443 ASSAY THYROID STIM HORMONE: CPT | Performed by: SPECIALIST

## 2021-01-01 PROCEDURE — 99284 EMERGENCY DEPT VISIT MOD MDM: CPT

## 2021-01-01 PROCEDURE — 1157F ADVNC CARE PLAN IN RCRD: CPT | Performed by: CLINICAL NURSE SPECIALIST

## 2021-01-01 PROCEDURE — 36415 COLL VENOUS BLD VENIPUNCTURE: CPT | Performed by: INTERNAL MEDICINE

## 2021-01-01 PROCEDURE — 10004651 HB RX, NO CHARGE ITEM: Performed by: INTERNAL MEDICINE

## 2021-01-01 PROCEDURE — 99356 PROLONGED SERV INPT OR OBS REQ UNIT FLOOR TIME BEYOND USUAL SER 1ST HR: CPT | Performed by: CLINICAL NURSE SPECIALIST

## 2021-01-01 PROCEDURE — 96366 THER/PROPH/DIAG IV INF ADDON: CPT

## 2021-01-01 PROCEDURE — 82330 ASSAY OF CALCIUM: CPT

## 2021-01-01 PROCEDURE — 76000 FLUOROSCOPY <1 HR PHYS/QHP: CPT

## 2021-01-01 PROCEDURE — 1126F AMNT PAIN NOTED NONE PRSNT: CPT | Performed by: CLINICAL NURSE SPECIALIST

## 2021-01-01 PROCEDURE — 80048 BASIC METABOLIC PNL TOTAL CA: CPT | Performed by: INTERNAL MEDICINE

## 2021-01-01 PROCEDURE — 83605 ASSAY OF LACTIC ACID: CPT | Performed by: EMERGENCY MEDICINE

## 2021-01-01 PROCEDURE — 10004451 HB PACU RECOVERY 1ST 30 MINUTES: Performed by: ORTHOPAEDIC SURGERY

## 2021-01-01 PROCEDURE — 86901 BLOOD TYPING SEROLOGIC RH(D): CPT | Performed by: EMERGENCY MEDICINE

## 2021-01-01 PROCEDURE — 80053 COMPREHEN METABOLIC PANEL: CPT | Performed by: INTERNAL MEDICINE

## 2021-01-01 PROCEDURE — 72125 CT NECK SPINE W/O DYE: CPT

## 2021-01-01 PROCEDURE — 83036 HEMOGLOBIN GLYCOSYLATED A1C: CPT | Performed by: CLINICAL MEDICAL LABORATORY

## 2021-01-01 PROCEDURE — 10002800 HB RX 250 W HCPCS

## 2021-01-01 PROCEDURE — 10006023 HB SUPPLY 272: Performed by: ORTHOPAEDIC SURGERY

## 2021-01-01 PROCEDURE — 81001 URINALYSIS AUTO W/SCOPE: CPT | Performed by: EMERGENCY MEDICINE

## 2021-01-01 PROCEDURE — 73502 X-RAY EXAM HIP UNI 2-3 VIEWS: CPT

## 2021-01-01 PROCEDURE — 10004452 HB PACU ADDL 30 MINUTES: Performed by: ORTHOPAEDIC SURGERY

## 2021-01-01 PROCEDURE — 93295 DEV INTERROG REMOTE 1/2/MLT: CPT | Performed by: INTERNAL MEDICINE

## 2021-01-01 PROCEDURE — 10006027 HB SUPPLY 278: Performed by: ORTHOPAEDIC SURGERY

## 2021-01-01 PROCEDURE — 83605 ASSAY OF LACTIC ACID: CPT | Performed by: INTERNAL MEDICINE

## 2021-01-01 PROCEDURE — 10003585 HB ROOM CHARGE INTERMEDIATE CARE

## 2021-01-01 PROCEDURE — 85027 COMPLETE CBC AUTOMATED: CPT | Performed by: CLINICAL MEDICAL LABORATORY

## 2021-01-01 PROCEDURE — 87086 URINE CULTURE/COLONY COUNT: CPT | Performed by: EMERGENCY MEDICINE

## 2021-01-01 PROCEDURE — 36415 COLL VENOUS BLD VENIPUNCTURE: CPT | Performed by: CLINICAL MEDICAL LABORATORY

## 2021-01-01 PROCEDURE — 93306 TTE W/DOPPLER COMPLETE: CPT

## 2021-01-01 PROCEDURE — 99232 SBSQ HOSP IP/OBS MODERATE 35: CPT | Performed by: INTERNAL MEDICINE

## 2021-01-01 PROCEDURE — 82550 ASSAY OF CK (CPK): CPT | Performed by: EMERGENCY MEDICINE

## 2021-01-01 PROCEDURE — 97535 SELF CARE MNGMENT TRAINING: CPT

## 2021-01-01 PROCEDURE — 97161 PT EVAL LOW COMPLEX 20 MIN: CPT

## 2021-01-01 PROCEDURE — 73700 CT LOWER EXTREMITY W/O DYE: CPT

## 2021-01-01 PROCEDURE — 84443 ASSAY THYROID STIM HORMONE: CPT | Performed by: CLINICAL MEDICAL LABORATORY

## 2021-01-01 PROCEDURE — 10002801 HB RX 250 W/O HCPCS

## 2021-01-01 PROCEDURE — 83880 ASSAY OF NATRIURETIC PEPTIDE: CPT | Performed by: INTERNAL MEDICINE

## 2021-01-01 PROCEDURE — C1713 ANCHOR/SCREW BN/BN,TIS/BN: HCPCS | Performed by: ORTHOPAEDIC SURGERY

## 2021-01-01 PROCEDURE — C1769 GUIDE WIRE: HCPCS | Performed by: ORTHOPAEDIC SURGERY

## 2021-01-01 PROCEDURE — 85730 THROMBOPLASTIN TIME PARTIAL: CPT | Performed by: EMERGENCY MEDICINE

## 2021-01-01 PROCEDURE — 83735 ASSAY OF MAGNESIUM: CPT | Performed by: EMERGENCY MEDICINE

## 2021-01-01 PROCEDURE — 0QH734Z INSERTION OF INTERNAL FIXATION DEVICE INTO LEFT UPPER FEMUR, PERCUTANEOUS APPROACH: ICD-10-PCS | Performed by: ORTHOPAEDIC SURGERY

## 2021-01-01 PROCEDURE — 83605 ASSAY OF LACTIC ACID: CPT

## 2021-01-01 PROCEDURE — 70450 CT HEAD/BRAIN W/O DYE: CPT

## 2021-01-01 PROCEDURE — 71275 CT ANGIOGRAPHY CHEST: CPT

## 2021-01-01 PROCEDURE — 99316 NF DSCHRG MGMT 30 MIN+: CPT | Performed by: CLINICAL NURSE SPECIALIST

## 2021-01-01 PROCEDURE — 84484 ASSAY OF TROPONIN QUANT: CPT | Performed by: INTERNAL MEDICINE

## 2021-01-01 PROCEDURE — 83735 ASSAY OF MAGNESIUM: CPT | Performed by: INTERNAL MEDICINE

## 2021-01-01 PROCEDURE — 80048 BASIC METABOLIC PNL TOTAL CA: CPT | Performed by: EMERGENCY MEDICINE

## 2021-01-01 PROCEDURE — 36415 COLL VENOUS BLD VENIPUNCTURE: CPT | Performed by: EMERGENCY MEDICINE

## 2021-01-01 PROCEDURE — 96360 HYDRATION IV INFUSION INIT: CPT

## 2021-01-01 PROCEDURE — 10000008 HB ROOM CHARGE ICU OR CCU

## 2021-01-01 PROCEDURE — 96374 THER/PROPH/DIAG INJ IV PUSH: CPT

## 2021-01-01 PROCEDURE — 71045 X-RAY EXAM CHEST 1 VIEW: CPT

## 2021-01-01 PROCEDURE — 13000002 HB ANESTHESIA  GENERAL  S/U + 1ST 15 MIN: Performed by: ORTHOPAEDIC SURGERY

## 2021-01-01 PROCEDURE — 97162 PT EVAL MOD COMPLEX 30 MIN: CPT

## 2021-01-01 PROCEDURE — 10002807 HB RX 258: Performed by: INTERNAL MEDICINE

## 2021-01-01 PROCEDURE — C9803 HOPD COVID-19 SPEC COLLECT: HCPCS

## 2021-01-01 PROCEDURE — 85027 COMPLETE CBC AUTOMATED: CPT | Performed by: INTERNAL MEDICINE

## 2021-01-01 PROCEDURE — 10000002 HB ROOM CHARGE MED SURG

## 2021-01-01 PROCEDURE — 80061 LIPID PANEL: CPT | Performed by: CLINICAL MEDICAL LABORATORY

## 2021-01-01 PROCEDURE — 13000117 HB ORTHO COMPLEX CASE EA ADD MINUTE: Performed by: ORTHOPAEDIC SURGERY

## 2021-01-01 PROCEDURE — 10002801 HB RX 250 W/O HCPCS: Performed by: ORTHOPAEDIC SURGERY

## 2021-01-01 PROCEDURE — 10002807 HB RX 258

## 2021-01-01 PROCEDURE — 82947 ASSAY GLUCOSE BLOOD QUANT: CPT

## 2021-01-01 PROCEDURE — 87077 CULTURE AEROBIC IDENTIFY: CPT | Performed by: EMERGENCY MEDICINE

## 2021-01-01 PROCEDURE — 81003 URINALYSIS AUTO W/O SCOPE: CPT | Performed by: EMERGENCY MEDICINE

## 2021-01-01 PROCEDURE — 83880 ASSAY OF NATRIURETIC PEPTIDE: CPT | Performed by: EMERGENCY MEDICINE

## 2021-01-01 PROCEDURE — 10002805 HB CONTRAST AGENT: Performed by: EMERGENCY MEDICINE

## 2021-01-01 PROCEDURE — 96365 THER/PROPH/DIAG IV INF INIT: CPT

## 2021-01-01 PROCEDURE — 87040 BLOOD CULTURE FOR BACTERIA: CPT | Performed by: EMERGENCY MEDICINE

## 2021-01-01 PROCEDURE — 36600 WITHDRAWAL OF ARTERIAL BLOOD: CPT

## 2021-01-01 PROCEDURE — 99309 SBSQ NF CARE MODERATE MDM 30: CPT | Performed by: CLINICAL NURSE SPECIALIST

## 2021-01-01 PROCEDURE — 82435 ASSAY OF BLOOD CHLORIDE: CPT

## 2021-01-01 PROCEDURE — 10002805 HB CONTRAST AGENT: Performed by: INTERNAL MEDICINE

## 2021-01-01 PROCEDURE — 10002801 HB RX 250 W/O HCPCS: Performed by: EMERGENCY MEDICINE

## 2021-01-01 PROCEDURE — 96361 HYDRATE IV INFUSION ADD-ON: CPT

## 2021-01-01 PROCEDURE — 96375 TX/PRO/DX INJ NEW DRUG ADDON: CPT

## 2021-01-01 PROCEDURE — 10002807 HB RX 258: Performed by: NURSE PRACTITIONER

## 2021-01-01 PROCEDURE — 85025 COMPLETE CBC W/AUTO DIFF WBC: CPT | Performed by: EMERGENCY MEDICINE

## 2021-01-01 PROCEDURE — 10002803 HB RX 637: Performed by: SPECIALIST

## 2021-01-01 DEVICE — IMPLANTABLE DEVICE: Type: IMPLANTABLE DEVICE | Site: HIP | Status: FUNCTIONAL

## 2021-01-01 RX ORDER — DIGOXIN 125 MCG
125 TABLET ORAL DAILY
Status: DISCONTINUED | OUTPATIENT
Start: 2021-01-01 | End: 2021-01-01

## 2021-01-01 RX ORDER — CHLORHEXIDINE GLUCONATE ORAL RINSE 1.2 MG/ML
15 SOLUTION DENTAL
Status: COMPLETED | OUTPATIENT
Start: 2021-01-01 | End: 2021-01-01

## 2021-01-01 RX ORDER — LIDOCAINE HYDROCHLORIDE 40 MG/ML
SOLUTION TOPICAL PRN
Status: DISCONTINUED | OUTPATIENT
Start: 2021-01-01 | End: 2021-01-01

## 2021-01-01 RX ORDER — FAMOTIDINE 10 MG/ML
20 INJECTION, SOLUTION INTRAVENOUS ONCE
Status: COMPLETED | OUTPATIENT
Start: 2021-01-01 | End: 2021-01-01

## 2021-01-01 RX ORDER — FUROSEMIDE 20 MG/1
20 TABLET ORAL DAILY
Status: DISCONTINUED | OUTPATIENT
Start: 2021-01-01 | End: 2021-01-01 | Stop reason: HOSPADM

## 2021-01-01 RX ORDER — GLYCOPYRROLATE 0.2 MG/ML
INJECTION, SOLUTION INTRAMUSCULAR; INTRAVENOUS PRN
Status: DISCONTINUED | OUTPATIENT
Start: 2021-01-01 | End: 2021-01-01

## 2021-01-01 RX ORDER — DONEPEZIL HYDROCHLORIDE 5 MG/1
5 TABLET, FILM COATED ORAL NIGHTLY
Status: DISCONTINUED | OUTPATIENT
Start: 2021-01-01 | End: 2021-01-01 | Stop reason: HOSPADM

## 2021-01-01 RX ORDER — DEXTROSE MONOHYDRATE 25 G/50ML
25 INJECTION, SOLUTION INTRAVENOUS PRN
Status: DISCONTINUED | OUTPATIENT
Start: 2021-01-01 | End: 2021-01-01 | Stop reason: HOSPADM

## 2021-01-01 RX ORDER — MORPHINE SULFATE IN 0.9 % NACL 30 MG/30ML
0-10 PATIENT CONTROLLED ANALGESIA SYRINGE INTRAVENOUS CONTINUOUS
Status: DISCONTINUED | OUTPATIENT
Start: 2021-01-01 | End: 2021-09-29 | Stop reason: HOSPADM

## 2021-01-01 RX ORDER — QUETIAPINE FUMARATE 25 MG/1
25 TABLET, FILM COATED ORAL NIGHTLY
Status: DISCONTINUED | OUTPATIENT
Start: 2021-01-01 | End: 2021-01-01 | Stop reason: HOSPADM

## 2021-01-01 RX ORDER — METHYLPREDNISOLONE SODIUM SUCCINATE 125 MG/2ML
125 INJECTION, POWDER, LYOPHILIZED, FOR SOLUTION INTRAMUSCULAR; INTRAVENOUS ONCE
Status: COMPLETED | OUTPATIENT
Start: 2021-01-01 | End: 2021-01-01

## 2021-01-01 RX ORDER — LORAZEPAM 2 MG/ML
INJECTION INTRAMUSCULAR
Status: COMPLETED
Start: 2021-01-01 | End: 2021-01-01

## 2021-01-01 RX ORDER — ROCURONIUM BROMIDE 10 MG/ML
INJECTION, SOLUTION INTRAVENOUS PRN
Status: DISCONTINUED | OUTPATIENT
Start: 2021-01-01 | End: 2021-01-01

## 2021-01-01 RX ORDER — MAGNESIUM SULFATE 4 G/50ML
4 INJECTION INTRAVENOUS ONCE
Status: DISCONTINUED | OUTPATIENT
Start: 2021-01-01 | End: 2021-01-01

## 2021-01-01 RX ORDER — ACETAMINOPHEN 325 MG/1
650 TABLET ORAL EVERY 4 HOURS PRN
Status: DISCONTINUED | OUTPATIENT
Start: 2021-01-01 | End: 2021-01-01 | Stop reason: HOSPADM

## 2021-01-01 RX ORDER — NOREPINEPHRINE BITARTRATE 0.03 MG/ML
INJECTION, SOLUTION INTRAVENOUS
Status: COMPLETED
Start: 2021-01-01 | End: 2021-01-01

## 2021-01-01 RX ORDER — APIXABAN 2.5 MG/1
TABLET, FILM COATED ORAL
Qty: 60 TABLET | Status: SHIPPED | OUTPATIENT
Start: 2021-01-01

## 2021-01-01 RX ORDER — SODIUM CHLORIDE, SODIUM LACTATE, POTASSIUM CHLORIDE, CALCIUM CHLORIDE 600; 310; 30; 20 MG/100ML; MG/100ML; MG/100ML; MG/100ML
INJECTION, SOLUTION INTRAVENOUS CONTINUOUS
Status: DISCONTINUED | OUTPATIENT
Start: 2021-01-01 | End: 2021-01-01 | Stop reason: HOSPADM

## 2021-01-01 RX ORDER — EPHEDRINE SULFATE/0.9% NACL/PF 50 MG/10ML
SYRINGE (ML) INTRAVENOUS PRN
Status: DISCONTINUED | OUTPATIENT
Start: 2021-01-01 | End: 2021-01-01

## 2021-01-01 RX ORDER — MEMANTINE HYDROCHLORIDE 10 MG/1
10 TABLET ORAL 2 TIMES DAILY
Status: DISCONTINUED | OUTPATIENT
Start: 2021-01-01 | End: 2021-01-01

## 2021-01-01 RX ORDER — ACETAMINOPHEN 325 MG/1
650 TABLET ORAL ONCE
Status: COMPLETED | OUTPATIENT
Start: 2021-01-01 | End: 2021-01-01

## 2021-01-01 RX ORDER — FUROSEMIDE 40 MG/1
40 TABLET ORAL DAILY
Status: DISCONTINUED | OUTPATIENT
Start: 2021-01-01 | End: 2021-01-01 | Stop reason: HOSPADM

## 2021-01-01 RX ORDER — LORAZEPAM 2 MG/ML
2 INJECTION INTRAMUSCULAR EVERY 4 HOURS PRN
Status: DISCONTINUED | OUTPATIENT
Start: 2021-01-01 | End: 2021-09-29 | Stop reason: HOSPADM

## 2021-01-01 RX ORDER — POTASSIUM CHLORIDE 14.9 MG/ML
20 INJECTION INTRAVENOUS ONCE
Status: COMPLETED | OUTPATIENT
Start: 2021-01-01 | End: 2021-01-01

## 2021-01-01 RX ORDER — BUPIVACAINE HYDROCHLORIDE AND EPINEPHRINE 2.5; 5 MG/ML; UG/ML
INJECTION, SOLUTION EPIDURAL; INFILTRATION; INTRACAUDAL; PERINEURAL PRN
Status: DISCONTINUED | OUTPATIENT
Start: 2021-01-01 | End: 2021-01-01 | Stop reason: HOSPADM

## 2021-01-01 RX ORDER — CYCLOBENZAPRINE HCL 5 MG
5 TABLET ORAL 3 TIMES DAILY PRN
Status: DISCONTINUED | OUTPATIENT
Start: 2021-01-01 | End: 2021-01-01 | Stop reason: HOSPADM

## 2021-01-01 RX ORDER — AMOXICILLIN 250 MG
2 CAPSULE ORAL AT BEDTIME
Status: DISCONTINUED | OUTPATIENT
Start: 2021-01-01 | End: 2021-01-01 | Stop reason: HOSPADM

## 2021-01-01 RX ORDER — CARVEDILOL 6.25 MG/1
6.25 TABLET ORAL 2 TIMES DAILY WITH MEALS
Status: DISCONTINUED | OUTPATIENT
Start: 2021-01-01 | End: 2021-01-01

## 2021-01-01 RX ORDER — HALOPERIDOL 5 MG/ML
2 INJECTION INTRAMUSCULAR ONCE
Status: COMPLETED | OUTPATIENT
Start: 2021-01-01 | End: 2021-01-01

## 2021-01-01 RX ORDER — HUMAN INSULIN 100 [IU]/ML
INJECTION, SOLUTION SUBCUTANEOUS
Status: DISCONTINUED | OUTPATIENT
Start: 2021-01-01 | End: 2021-01-01 | Stop reason: HOSPADM

## 2021-01-01 RX ORDER — ASPIRIN 81 MG/1
81 TABLET, CHEWABLE ORAL DAILY
Status: DISCONTINUED | OUTPATIENT
Start: 2021-01-01 | End: 2021-01-01

## 2021-01-01 RX ORDER — METOPROLOL SUCCINATE 25 MG/1
TABLET, EXTENDED RELEASE ORAL
Qty: 30 TABLET | Status: SHIPPED | OUTPATIENT
Start: 2021-01-01

## 2021-01-01 RX ORDER — FAMOTIDINE 20 MG/1
20 TABLET, FILM COATED ORAL
Status: DISCONTINUED | OUTPATIENT
Start: 2021-01-01 | End: 2021-01-01 | Stop reason: HOSPADM

## 2021-01-01 RX ORDER — DONEPEZIL HYDROCHLORIDE 5 MG/1
5 TABLET, FILM COATED ORAL NIGHTLY
Status: DISCONTINUED | OUTPATIENT
Start: 2021-01-01 | End: 2021-01-01

## 2021-01-01 RX ORDER — HYDRALAZINE HYDROCHLORIDE 20 MG/ML
5 INJECTION INTRAMUSCULAR; INTRAVENOUS EVERY 10 MIN PRN
Status: DISCONTINUED | OUTPATIENT
Start: 2021-01-01 | End: 2021-01-01 | Stop reason: HOSPADM

## 2021-01-01 RX ORDER — BISACODYL 10 MG
10 SUPPOSITORY, RECTAL RECTAL DAILY PRN
Status: DISCONTINUED | OUTPATIENT
Start: 2021-01-01 | End: 2021-01-01 | Stop reason: HOSPADM

## 2021-01-01 RX ORDER — ACETAMINOPHEN 500 MG
1000 TABLET ORAL EVERY 8 HOURS SCHEDULED
Status: DISCONTINUED | OUTPATIENT
Start: 2021-01-01 | End: 2021-01-01 | Stop reason: HOSPADM

## 2021-01-01 RX ORDER — SODIUM CHLORIDE 9 MG/ML
INJECTION, SOLUTION INTRAVENOUS CONTINUOUS
Status: DISCONTINUED | OUTPATIENT
Start: 2021-01-01 | End: 2021-01-01 | Stop reason: HOSPADM

## 2021-01-01 RX ORDER — POLYETHYLENE GLYCOL 3350 17 G/17G
17 POWDER, FOR SOLUTION ORAL DAILY
Status: DISCONTINUED | OUTPATIENT
Start: 2021-01-01 | End: 2021-01-01 | Stop reason: HOSPADM

## 2021-01-01 RX ORDER — ESMOLOL HYDROCHLORIDE 10 MG/ML
INJECTION INTRAVENOUS PRN
Status: DISCONTINUED | OUTPATIENT
Start: 2021-01-01 | End: 2021-01-01

## 2021-01-01 RX ORDER — AMOXICILLIN 250 MG
2 CAPSULE ORAL AT BEDTIME
COMMUNITY

## 2021-01-01 RX ORDER — AMOXICILLIN 250 MG
2 CAPSULE ORAL DAILY
Status: DISCONTINUED | OUTPATIENT
Start: 2021-01-01 | End: 2021-01-01 | Stop reason: HOSPADM

## 2021-01-01 RX ORDER — TRAMADOL HYDROCHLORIDE 50 MG/1
50 TABLET ORAL EVERY 6 HOURS PRN
Status: DISCONTINUED | OUTPATIENT
Start: 2021-01-01 | End: 2021-01-01 | Stop reason: HOSPADM

## 2021-01-01 RX ORDER — FUROSEMIDE 20 MG/1
20 TABLET ORAL DAILY
Qty: 30 TABLET | Refills: 3 | Status: SHIPPED | OUTPATIENT
Start: 2021-01-01 | End: 2021-01-01 | Stop reason: SDUPTHER

## 2021-01-01 RX ORDER — PANTOPRAZOLE SODIUM 40 MG/1
40 TABLET, DELAYED RELEASE ORAL DAILY
Status: DISCONTINUED | OUTPATIENT
Start: 2021-01-01 | End: 2021-01-01 | Stop reason: HOSPADM

## 2021-01-01 RX ORDER — EMOLLIENT COMBINATION NO.97
1 OINTMENT (GRAM) TOPICAL 3 TIMES DAILY
COMMUNITY

## 2021-01-01 RX ORDER — FUROSEMIDE 20 MG/1
40 TABLET ORAL DAILY
Qty: 30 TABLET | Refills: 3 | OUTPATIENT
Start: 2021-01-01 | End: 2021-01-01 | Stop reason: SDUPTHER

## 2021-01-01 RX ORDER — DEXTROSE AND SODIUM CHLORIDE 5; .45 G/100ML; G/100ML
INJECTION, SOLUTION INTRAVENOUS CONTINUOUS
Status: DISCONTINUED | OUTPATIENT
Start: 2021-01-01 | End: 2021-01-01

## 2021-01-01 RX ORDER — NOREPINEPHRINE BITARTRATE 0.03 MG/ML
0-30 INJECTION, SOLUTION INTRAVENOUS CONTINUOUS
Status: DISCONTINUED | OUTPATIENT
Start: 2021-01-01 | End: 2021-01-01

## 2021-01-01 RX ORDER — ATORVASTATIN CALCIUM 10 MG/1
TABLET, FILM COATED ORAL
Qty: 30 TABLET | Status: SHIPPED | OUTPATIENT
Start: 2021-01-01

## 2021-01-01 RX ORDER — 0.9 % SODIUM CHLORIDE 0.9 %
2 VIAL (ML) INJECTION EVERY 12 HOURS SCHEDULED
Status: DISCONTINUED | OUTPATIENT
Start: 2021-01-01 | End: 2021-01-01 | Stop reason: HOSPADM

## 2021-01-01 RX ORDER — DONEPEZIL HYDROCHLORIDE 5 MG/1
5 TABLET, FILM COATED ORAL AT BEDTIME
Status: DISCONTINUED | OUTPATIENT
Start: 2021-01-01 | End: 2021-01-01

## 2021-01-01 RX ORDER — FUROSEMIDE 40 MG/1
TABLET ORAL
Qty: 30 TABLET | Status: SHIPPED | OUTPATIENT
Start: 2021-01-01

## 2021-01-01 RX ORDER — MEMANTINE HYDROCHLORIDE 10 MG/1
10 TABLET ORAL 2 TIMES DAILY
COMMUNITY
Start: 2021-01-01 | End: 2021-01-01

## 2021-01-01 RX ORDER — LORAZEPAM 2 MG/ML
1 INJECTION INTRAMUSCULAR EVERY 4 HOURS PRN
Status: DISCONTINUED | OUTPATIENT
Start: 2021-01-01 | End: 2021-01-01

## 2021-01-01 RX ORDER — ATORVASTATIN CALCIUM 10 MG/1
10 TABLET, FILM COATED ORAL AT BEDTIME
Status: DISCONTINUED | OUTPATIENT
Start: 2021-01-01 | End: 2021-01-01 | Stop reason: HOSPADM

## 2021-01-01 RX ORDER — DEXTROSE MONOHYDRATE 50 MG/ML
INJECTION, SOLUTION INTRAVENOUS CONTINUOUS PRN
Status: DISCONTINUED | OUTPATIENT
Start: 2021-01-01 | End: 2021-01-01

## 2021-01-01 RX ORDER — SODIUM CHLORIDE 9 MG/ML
INJECTION, SOLUTION INTRAVENOUS CONTINUOUS
Status: DISCONTINUED | OUTPATIENT
Start: 2021-01-01 | End: 2021-01-01

## 2021-01-01 RX ORDER — METOPROLOL SUCCINATE 25 MG/1
25 TABLET, EXTENDED RELEASE ORAL AT BEDTIME
Qty: 30 TABLET | Refills: 11 | Status: SHIPPED | OUTPATIENT
Start: 2021-01-01 | End: 2021-01-01

## 2021-01-01 RX ORDER — CLINDAMYCIN PHOSPHATE 900 MG/50ML
900 INJECTION INTRAVENOUS
Status: COMPLETED | OUTPATIENT
Start: 2021-01-01 | End: 2021-01-01

## 2021-01-01 RX ORDER — DONEPEZIL HYDROCHLORIDE 5 MG/1
TABLET, FILM COATED ORAL
Qty: 30 TABLET | Status: SHIPPED | OUTPATIENT
Start: 2021-01-01

## 2021-01-01 RX ORDER — HALOPERIDOL 5 MG/ML
5 INJECTION INTRAMUSCULAR EVERY 4 HOURS PRN
Status: DISCONTINUED | OUTPATIENT
Start: 2021-01-01 | End: 2021-01-01 | Stop reason: HOSPADM

## 2021-01-01 RX ORDER — CARVEDILOL 6.25 MG/1
6.25 TABLET ORAL 2 TIMES DAILY WITH MEALS
Status: ON HOLD | COMMUNITY
End: 2021-01-01 | Stop reason: HOSPADM

## 2021-01-01 RX ORDER — DEXAMETHASONE SODIUM PHOSPHATE 4 MG/ML
INJECTION, SOLUTION INTRA-ARTICULAR; INTRALESIONAL; INTRAMUSCULAR; INTRAVENOUS; SOFT TISSUE PRN
Status: DISCONTINUED | OUTPATIENT
Start: 2021-01-01 | End: 2021-01-01

## 2021-01-01 RX ORDER — HALOPERIDOL 5 MG/ML
2 INJECTION INTRAMUSCULAR EVERY 6 HOURS PRN
Status: DISCONTINUED | OUTPATIENT
Start: 2021-01-01 | End: 2021-01-01

## 2021-01-01 RX ORDER — ASCORBIC ACID 500 MG
250 TABLET ORAL DAILY
Status: DISCONTINUED | OUTPATIENT
Start: 2021-01-01 | End: 2021-01-01 | Stop reason: HOSPADM

## 2021-01-01 RX ORDER — ONDANSETRON 2 MG/ML
INJECTION INTRAMUSCULAR; INTRAVENOUS PRN
Status: DISCONTINUED | OUTPATIENT
Start: 2021-01-01 | End: 2021-01-01

## 2021-01-01 RX ORDER — ATORVASTATIN CALCIUM 10 MG/1
10 TABLET, FILM COATED ORAL DAILY
Status: DISCONTINUED | OUTPATIENT
Start: 2021-01-01 | End: 2021-01-01

## 2021-01-01 RX ORDER — AMOXICILLIN 250 MG
2 CAPSULE ORAL DAILY
Qty: 60 TABLET | Refills: 0 | Status: SHIPPED | COMMUNITY
Start: 2021-01-01 | End: 2021-01-01

## 2021-01-01 RX ORDER — NEOSTIGMINE METHYLSULFATE 4 MG/4 ML
SYRINGE (ML) INTRAVENOUS PRN
Status: DISCONTINUED | OUTPATIENT
Start: 2021-01-01 | End: 2021-01-01

## 2021-01-01 RX ORDER — MEMANTINE HYDROCHLORIDE 10 MG/1
TABLET ORAL
Qty: 60 TABLET | Status: SHIPPED | OUTPATIENT
Start: 2021-01-01

## 2021-01-01 RX ORDER — HALOPERIDOL 5 MG/ML
5 INJECTION INTRAMUSCULAR EVERY 4 HOURS PRN
Status: DISCONTINUED | OUTPATIENT
Start: 2021-01-01 | End: 2021-09-29 | Stop reason: HOSPADM

## 2021-01-01 RX ORDER — LIDOCAINE HYDROCHLORIDE 20 MG/ML
INJECTION, SOLUTION INFILTRATION; PERINEURAL PRN
Status: DISCONTINUED | OUTPATIENT
Start: 2021-01-01 | End: 2021-01-01

## 2021-01-01 RX ORDER — PROPOFOL 10 MG/ML
INJECTION, EMULSION INTRAVENOUS PRN
Status: DISCONTINUED | OUTPATIENT
Start: 2021-01-01 | End: 2021-01-01

## 2021-01-01 RX ORDER — FUROSEMIDE 10 MG/ML
80 INJECTION INTRAMUSCULAR; INTRAVENOUS ONCE
Status: COMPLETED | OUTPATIENT
Start: 2021-01-01 | End: 2021-01-01

## 2021-01-01 RX ORDER — BISACODYL 10 MG
10 SUPPOSITORY, RECTAL RECTAL DAILY PRN
Qty: 10 SUPPOSITORY | Refills: 0 | Status: SHIPPED | COMMUNITY
Start: 2021-01-01 | End: 2021-01-01 | Stop reason: ALTCHOICE

## 2021-01-01 RX ORDER — METOPROLOL SUCCINATE 25 MG/1
25 TABLET, EXTENDED RELEASE ORAL AT BEDTIME
Status: DISCONTINUED | OUTPATIENT
Start: 2021-01-01 | End: 2021-01-01 | Stop reason: HOSPADM

## 2021-01-01 RX ORDER — QUETIAPINE FUMARATE 25 MG/1
25 TABLET, FILM COATED ORAL AT BEDTIME
Status: ON HOLD | COMMUNITY
End: 2021-01-01 | Stop reason: HOSPADM

## 2021-01-01 RX ORDER — LOSARTAN POTASSIUM 50 MG/1
25 TABLET ORAL DAILY
Status: DISCONTINUED | OUTPATIENT
Start: 2021-01-01 | End: 2021-01-01 | Stop reason: HOSPADM

## 2021-01-01 RX ORDER — ATORVASTATIN CALCIUM 10 MG/1
10 TABLET, FILM COATED ORAL DAILY
Status: DISCONTINUED | OUTPATIENT
Start: 2021-01-01 | End: 2021-01-01 | Stop reason: HOSPADM

## 2021-01-01 RX ORDER — FUROSEMIDE 10 MG/ML
20 INJECTION INTRAMUSCULAR; INTRAVENOUS ONCE
Status: COMPLETED | OUTPATIENT
Start: 2021-01-01 | End: 2021-01-01

## 2021-01-01 RX ORDER — CARVEDILOL 12.5 MG/1
25 TABLET ORAL 2 TIMES DAILY WITH MEALS
Status: DISCONTINUED | OUTPATIENT
Start: 2021-01-01 | End: 2021-01-01 | Stop reason: HOSPADM

## 2021-01-01 RX ORDER — ACETAMINOPHEN 500 MG
1000 TABLET ORAL EVERY 8 HOURS SCHEDULED
Qty: 60 TABLET | Refills: 0 | Status: SHIPPED | COMMUNITY
Start: 2021-01-01

## 2021-01-01 RX ORDER — FUROSEMIDE 20 MG/1
20 TABLET ORAL DAILY
Status: DISCONTINUED | OUTPATIENT
Start: 2021-01-01 | End: 2021-01-01

## 2021-01-01 RX ORDER — HALOPERIDOL 5 MG/ML
2 INJECTION INTRAMUSCULAR ONCE
Status: DISCONTINUED | OUTPATIENT
Start: 2021-01-01 | End: 2021-01-01 | Stop reason: CLARIF

## 2021-01-01 RX ORDER — QUETIAPINE FUMARATE 25 MG/1
25 TABLET, FILM COATED ORAL NIGHTLY
Qty: 30 TABLET | Refills: 3 | Status: SHIPPED
Start: 2021-01-01 | End: 2021-01-01

## 2021-01-01 RX ORDER — CLINDAMYCIN PHOSPHATE 900 MG/50ML
900 INJECTION INTRAVENOUS EVERY 8 HOURS SCHEDULED
Status: COMPLETED | OUTPATIENT
Start: 2021-01-01 | End: 2021-01-01

## 2021-01-01 RX ORDER — SODIUM CHLORIDE 450 MG/100ML
INJECTION, SOLUTION INTRAVENOUS CONTINUOUS
Status: DISCONTINUED | OUTPATIENT
Start: 2021-01-01 | End: 2021-01-01

## 2021-01-01 RX ORDER — CARVEDILOL 6.25 MG/1
6.25 TABLET ORAL ONCE
Status: COMPLETED | OUTPATIENT
Start: 2021-01-01 | End: 2021-01-01

## 2021-01-01 RX ORDER — LIDOCAINE HYDROCHLORIDE 10 MG/ML
5-10 INJECTION, SOLUTION INFILTRATION; PERINEURAL PRN
Status: DISCONTINUED | OUTPATIENT
Start: 2021-01-01 | End: 2021-01-01 | Stop reason: HOSPADM

## 2021-01-01 RX ORDER — CHOLECALCIFEROL (VITAMIN D3) 25 MCG
50 TABLET ORAL DAILY
Qty: 60 TABLET | Refills: 0 | Status: SHIPPED | COMMUNITY
Start: 2021-01-01 | End: 2021-01-01

## 2021-01-01 RX ORDER — MEMANTINE HYDROCHLORIDE 10 MG/1
10 TABLET ORAL DAILY
Status: DISCONTINUED | OUTPATIENT
Start: 2021-01-01 | End: 2021-01-01 | Stop reason: HOSPADM

## 2021-01-01 RX ADMIN — FUROSEMIDE 20 MG: 20 TABLET ORAL at 15:39

## 2021-01-01 RX ADMIN — PROPOFOL 60 MG: 10 INJECTION, EMULSION INTRAVENOUS at 13:10

## 2021-01-01 RX ADMIN — DOCUSATE SODIUM AND SENNOSIDES 2 TABLET: 8.6; 5 TABLET, FILM COATED ORAL at 08:56

## 2021-01-01 RX ADMIN — CARVEDILOL 6.25 MG: 6.25 TABLET, FILM COATED ORAL at 08:57

## 2021-01-01 RX ADMIN — POTASSIUM CHLORIDE 20 MEQ: 14.9 INJECTION, SOLUTION INTRAVENOUS at 23:06

## 2021-01-01 RX ADMIN — CLINDAMYCIN IN 5 PERCENT DEXTROSE 900 MG: 18 INJECTION, SOLUTION INTRAVENOUS at 05:38

## 2021-01-01 RX ADMIN — ATORVASTATIN CALCIUM 10 MG: 10 TABLET, FILM COATED ORAL at 09:20

## 2021-01-01 RX ADMIN — PANTOPRAZOLE SODIUM 40 MG: 40 TABLET, DELAYED RELEASE ORAL at 08:40

## 2021-01-01 RX ADMIN — VANCOMYCIN HYDROCHLORIDE 1250 MG: 10 INJECTION, POWDER, LYOPHILIZED, FOR SOLUTION INTRAVENOUS at 20:06

## 2021-01-01 RX ADMIN — ASPIRIN 81 MG: 81 TABLET, COATED ORAL at 08:57

## 2021-01-01 RX ADMIN — HALOPERIDOL LACTATE 2 MG: 5 INJECTION INTRAMUSCULAR at 18:44

## 2021-01-01 RX ADMIN — SODIUM CHLORIDE 2 ML: 9 INJECTION, SOLUTION INTRAMUSCULAR; INTRAVENOUS; SUBCUTANEOUS at 09:21

## 2021-01-01 RX ADMIN — ACETAMINOPHEN 1000 MG: 500 TABLET ORAL at 05:08

## 2021-01-01 RX ADMIN — MEMANTINE HYDROCHLORIDE 10 MG: 10 TABLET, FILM COATED ORAL at 08:57

## 2021-01-01 RX ADMIN — SODIUM CHLORIDE, PRESERVATIVE FREE 2 ML: 5 INJECTION INTRAVENOUS at 21:58

## 2021-01-01 RX ADMIN — LORAZEPAM 2 MG: 2 INJECTION INTRAMUSCULAR; INTRAVENOUS at 01:47

## 2021-01-01 RX ADMIN — SODIUM CHLORIDE 1000 ML: 9 INJECTION, SOLUTION INTRAVENOUS at 17:25

## 2021-01-01 RX ADMIN — FUROSEMIDE 40 MG: 40 TABLET ORAL at 11:13

## 2021-01-01 RX ADMIN — SODIUM CHLORIDE 500 ML: 0.9 INJECTION, SOLUTION INTRAVENOUS at 14:27

## 2021-01-01 RX ADMIN — DOCUSATE SODIUM AND SENNOSIDES 2 TABLET: 8.6; 5 TABLET, FILM COATED ORAL at 20:03

## 2021-01-01 RX ADMIN — METRONIDAZOLE 500 MG: 500 INJECTION, SOLUTION INTRAVENOUS at 19:17

## 2021-01-01 RX ADMIN — DONEPEZIL HYDROCHLORIDE 5 MG: 5 TABLET, FILM COATED ORAL at 20:58

## 2021-01-01 RX ADMIN — DONEPEZIL HYDROCHLORIDE 5 MG: 5 TABLET, FILM COATED ORAL at 21:56

## 2021-01-01 RX ADMIN — HYDROMORPHONE HYDROCHLORIDE 0.2 MG: 1 INJECTION, SOLUTION INTRAMUSCULAR; INTRAVENOUS; SUBCUTANEOUS at 05:13

## 2021-01-01 RX ADMIN — FUROSEMIDE 20 MG: 20 TABLET ORAL at 08:41

## 2021-01-01 RX ADMIN — APIXABAN 2.5 MG: 2.5 TABLET, FILM COATED ORAL at 20:03

## 2021-01-01 RX ADMIN — ACETAMINOPHEN 1000 MG: 500 TABLET ORAL at 16:33

## 2021-01-01 RX ADMIN — APIXABAN 2.5 MG: 2.5 TABLET, FILM COATED ORAL at 08:40

## 2021-01-01 RX ADMIN — Medication 2 MG/HR: at 01:00

## 2021-01-01 RX ADMIN — CYCLOBENZAPRINE HYDROCHLORIDE 5 MG: 5 TABLET, FILM COATED ORAL at 22:58

## 2021-01-01 RX ADMIN — PANTOPRAZOLE SODIUM 40 MG: 40 TABLET, DELAYED RELEASE ORAL at 08:57

## 2021-01-01 RX ADMIN — Medication 50 MCG: at 11:14

## 2021-01-01 RX ADMIN — ATORVASTATIN CALCIUM 10 MG: 10 TABLET, FILM COATED ORAL at 08:57

## 2021-01-01 RX ADMIN — ACETAMINOPHEN 1000 MG: 500 TABLET ORAL at 14:00

## 2021-01-01 RX ADMIN — OXYCODONE HYDROCHLORIDE AND ACETAMINOPHEN 250 MG: 500 TABLET ORAL at 08:40

## 2021-01-01 RX ADMIN — NEOSTIGMINE METHYLSULFATE 3 MG: 1 INJECTION, SOLUTION INTRAVENOUS at 13:59

## 2021-01-01 RX ADMIN — CARVEDILOL 25 MG: 12.5 TABLET, FILM COATED ORAL at 20:57

## 2021-01-01 RX ADMIN — POLYETHYLENE GLYCOL 3350 17 G: 17 POWDER, FOR SOLUTION ORAL at 08:40

## 2021-01-01 RX ADMIN — SODIUM CHLORIDE, PRESERVATIVE FREE 2 ML: 5 INJECTION INTRAVENOUS at 20:58

## 2021-01-01 RX ADMIN — ASPIRIN 81 MG: 81 TABLET, COATED ORAL at 17:10

## 2021-01-01 RX ADMIN — Medication 5 MG/HR: at 18:21

## 2021-01-01 RX ADMIN — ACETAMINOPHEN 1000 MG: 500 TABLET ORAL at 22:58

## 2021-01-01 RX ADMIN — ONDANSETRON 4 MG: 2 INJECTION INTRAMUSCULAR; INTRAVENOUS at 13:57

## 2021-01-01 RX ADMIN — APIXABAN 2.5 MG: 2.5 TABLET, FILM COATED ORAL at 14:30

## 2021-01-01 RX ADMIN — ACETAMINOPHEN 1000 MG: 500 TABLET ORAL at 14:10

## 2021-01-01 RX ADMIN — MORPHINE SULFATE 2 MG: 2 INJECTION, SOLUTION INTRAMUSCULAR; INTRAVENOUS at 23:38

## 2021-01-01 RX ADMIN — MEMANTINE HYDROCHLORIDE 10 MG: 10 TABLET, FILM COATED ORAL at 10:43

## 2021-01-01 RX ADMIN — CARVEDILOL 25 MG: 12.5 TABLET, FILM COATED ORAL at 11:20

## 2021-01-01 RX ADMIN — Medication 20 MEQ: at 19:16

## 2021-01-01 RX ADMIN — DONEPEZIL HYDROCHLORIDE 5 MG: 5 TABLET, FILM COATED ORAL at 22:58

## 2021-01-01 RX ADMIN — Medication 50 MCG: at 16:33

## 2021-01-01 RX ADMIN — CLINDAMYCIN IN 5 PERCENT DEXTROSE 900 MG: 18 INJECTION, SOLUTION INTRAVENOUS at 13:16

## 2021-01-01 RX ADMIN — ASPIRIN 81 MG: 81 TABLET, DELAYED RELEASE ORAL at 11:14

## 2021-01-01 RX ADMIN — TRAMADOL HYDROCHLORIDE 50 MG: 50 TABLET, FILM COATED ORAL at 22:57

## 2021-01-01 RX ADMIN — QUETIAPINE 25 MG: 25 TABLET, FILM COATED ORAL at 21:56

## 2021-01-01 RX ADMIN — FUROSEMIDE 80 MG: 10 INJECTION, SOLUTION INTRAMUSCULAR; INTRAVENOUS at 21:30

## 2021-01-01 RX ADMIN — NOREPINEPHRINE BITARTRATE 2 MCG/MIN: 0.03 INJECTION, SOLUTION INTRAVENOUS at 22:36

## 2021-01-01 RX ADMIN — SODIUM CHLORIDE, POTASSIUM CHLORIDE, SODIUM LACTATE AND CALCIUM CHLORIDE: 600; 310; 30; 20 INJECTION, SOLUTION INTRAVENOUS at 13:00

## 2021-01-01 RX ADMIN — CARVEDILOL 6.25 MG: 6.25 TABLET, FILM COATED ORAL at 08:40

## 2021-01-01 RX ADMIN — ASPIRIN 81 MG: 81 TABLET, DELAYED RELEASE ORAL at 08:57

## 2021-01-01 RX ADMIN — CLINDAMYCIN IN 5 PERCENT DEXTROSE 900 MG: 18 INJECTION, SOLUTION INTRAVENOUS at 23:07

## 2021-01-01 RX ADMIN — FUROSEMIDE 40 MG: 40 TABLET ORAL at 09:20

## 2021-01-01 RX ADMIN — POTASSIUM CHLORIDE 20 MEQ: 14.9 INJECTION, SOLUTION INTRAVENOUS at 21:34

## 2021-01-01 RX ADMIN — DONEPEZIL HYDROCHLORIDE 5 MG: 5 TABLET, FILM COATED ORAL at 22:47

## 2021-01-01 RX ADMIN — ROCURONIUM BROMIDE 25 MG: 50 INJECTION, SOLUTION INTRAVENOUS at 13:10

## 2021-01-01 RX ADMIN — Medication 2000 UNITS: at 17:10

## 2021-01-01 RX ADMIN — CARVEDILOL 6.25 MG: 6.25 TABLET, FILM COATED ORAL at 13:24

## 2021-01-01 RX ADMIN — TRAMADOL HYDROCHLORIDE 50 MG: 50 TABLET, FILM COATED ORAL at 15:40

## 2021-01-01 RX ADMIN — DOCUSATE SODIUM AND SENNOSIDES 2 TABLET: 8.6; 5 TABLET, FILM COATED ORAL at 16:33

## 2021-01-01 RX ADMIN — POLYETHYLENE GLYCOL 3350 17 G: 17 POWDER, FOR SOLUTION ORAL at 17:10

## 2021-01-01 RX ADMIN — SODIUM CHLORIDE: 0.45 INJECTION, SOLUTION INTRAVENOUS at 22:47

## 2021-01-01 RX ADMIN — OXYCODONE HYDROCHLORIDE AND ACETAMINOPHEN 250 MG: 500 TABLET ORAL at 08:57

## 2021-01-01 RX ADMIN — FUROSEMIDE 20 MG: 10 INJECTION, SOLUTION INTRAMUSCULAR; INTRAVENOUS at 17:09

## 2021-01-01 RX ADMIN — Medication 2000 UNITS: at 08:57

## 2021-01-01 RX ADMIN — DIGOXIN 125 MCG: 125 TABLET ORAL at 08:57

## 2021-01-01 RX ADMIN — SODIUM CHLORIDE 500 ML: 0.9 INJECTION, SOLUTION INTRAVENOUS at 19:18

## 2021-01-01 RX ADMIN — FENTANYL CITRATE 25 MCG: 50 INJECTION INTRAMUSCULAR; INTRAVENOUS at 19:10

## 2021-01-01 RX ADMIN — CARVEDILOL 25 MG: 12.5 TABLET, FILM COATED ORAL at 16:33

## 2021-01-01 RX ADMIN — FENTANYL CITRATE 25 MCG: 50 INJECTION, SOLUTION INTRAMUSCULAR; INTRAVENOUS at 20:54

## 2021-01-01 RX ADMIN — SODIUM CHLORIDE: 9 INJECTION, SOLUTION INTRAVENOUS at 16:42

## 2021-01-01 RX ADMIN — CARVEDILOL 25 MG: 12.5 TABLET, FILM COATED ORAL at 11:13

## 2021-01-01 RX ADMIN — DOCUSATE SODIUM AND SENNOSIDES 2 TABLET: 8.6; 5 TABLET, FILM COATED ORAL at 22:27

## 2021-01-01 RX ADMIN — METHYLPREDNISOLONE SODIUM SUCCINATE 125 MG: 125 INJECTION, POWDER, FOR SOLUTION INTRAMUSCULAR; INTRAVENOUS at 17:47

## 2021-01-01 RX ADMIN — Medication 50 MCG: at 08:56

## 2021-01-01 RX ADMIN — ACETAMINOPHEN 1000 MG: 500 TABLET ORAL at 05:36

## 2021-01-01 RX ADMIN — Medication 2000 UNITS: at 08:40

## 2021-01-01 RX ADMIN — DEXAMETHASONE SODIUM PHOSPHATE 4 MG: 4 INJECTION, SOLUTION INTRAMUSCULAR; INTRAVENOUS at 13:37

## 2021-01-01 RX ADMIN — APIXABAN 2.5 MG: 2.5 TABLET, FILM COATED ORAL at 22:27

## 2021-01-01 RX ADMIN — METOPROLOL SUCCINATE 25 MG: 25 TABLET, EXTENDED RELEASE ORAL at 09:50

## 2021-01-01 RX ADMIN — IOHEXOL 60 ML: 350 INJECTION, SOLUTION INTRAVENOUS at 18:09

## 2021-01-01 RX ADMIN — GLYCOPYRROLATE 0.4 MG: 0.2 INJECTION, SOLUTION INTRAMUSCULAR; INTRAVENOUS at 13:59

## 2021-01-01 RX ADMIN — AMIODARONE HYDROCHLORIDE 1 MG/MIN: 50 INJECTION, SOLUTION INTRAVENOUS at 21:08

## 2021-01-01 RX ADMIN — HALOPERIDOL LACTATE 5 MG: 5 INJECTION, SOLUTION INTRAMUSCULAR at 05:46

## 2021-01-01 RX ADMIN — DIPHENHYDRAMINE HYDROCHLORIDE 25 MG: 25 LIQUID ORAL at 17:48

## 2021-01-01 RX ADMIN — PERFLUTREN 4 ML: 6.52 INJECTION, SUSPENSION INTRAVENOUS at 12:15

## 2021-01-01 RX ADMIN — SODIUM CHLORIDE 1000 ML: 9 INJECTION, SOLUTION INTRAVENOUS at 14:58

## 2021-01-01 RX ADMIN — HALOPERIDOL LACTATE 5 MG: 5 INJECTION, SOLUTION INTRAMUSCULAR at 22:50

## 2021-01-01 RX ADMIN — APIXABAN 2.5 MG: 2.5 TABLET, FILM COATED ORAL at 20:58

## 2021-01-01 RX ADMIN — ATORVASTATIN CALCIUM 10 MG: 10 TABLET, FILM COATED ORAL at 20:03

## 2021-01-01 RX ADMIN — APIXABAN 2.5 MG: 2.5 TABLET, FILM COATED ORAL at 08:57

## 2021-01-01 RX ADMIN — ACETAMINOPHEN 1000 MG: 500 TABLET ORAL at 20:58

## 2021-01-01 RX ADMIN — ACETAMINOPHEN 1000 MG: 500 TABLET ORAL at 15:01

## 2021-01-01 RX ADMIN — LORAZEPAM 2 MG: 2 INJECTION INTRAMUSCULAR; INTRAVENOUS at 23:39

## 2021-01-01 RX ADMIN — OXYCODONE HYDROCHLORIDE AND ACETAMINOPHEN 250 MG: 500 TABLET ORAL at 17:09

## 2021-01-01 RX ADMIN — CARVEDILOL 6.25 MG: 6.25 TABLET, FILM COATED ORAL at 17:33

## 2021-01-01 RX ADMIN — LOSARTAN POTASSIUM 25 MG: 50 TABLET, FILM COATED ORAL at 10:45

## 2021-01-01 RX ADMIN — AMIODARONE HYDROCHLORIDE 150 MG: 1.5 INJECTION, SOLUTION INTRAVENOUS at 19:49

## 2021-01-01 RX ADMIN — Medication 50 MCG: at 10:48

## 2021-01-01 RX ADMIN — LIDOCAINE HYDROCHLORIDE 1.5 ML: 20 INJECTION, SOLUTION INFILTRATION; PERINEURAL at 13:10

## 2021-01-01 RX ADMIN — APIXABAN 2.5 MG: 2.5 TABLET, FILM COATED ORAL at 11:14

## 2021-01-01 RX ADMIN — ACETAMINOPHEN 1000 MG: 500 TABLET ORAL at 21:56

## 2021-01-01 RX ADMIN — FUROSEMIDE 40 MG: 40 TABLET ORAL at 10:46

## 2021-01-01 RX ADMIN — APIXABAN 2.5 MG: 2.5 TABLET, FILM COATED ORAL at 21:56

## 2021-01-01 RX ADMIN — SODIUM CHLORIDE, PRESERVATIVE FREE 2 ML: 5 INJECTION INTRAVENOUS at 14:30

## 2021-01-01 RX ADMIN — SODIUM CHLORIDE, PRESERVATIVE FREE 2 ML: 5 INJECTION INTRAVENOUS at 11:15

## 2021-01-01 RX ADMIN — CEFEPIME HYDROCHLORIDE 2000 MG: 2 INJECTION, POWDER, FOR SOLUTION INTRAVENOUS at 18:44

## 2021-01-01 RX ADMIN — ATORVASTATIN CALCIUM 10 MG: 10 TABLET, FILM COATED ORAL at 10:51

## 2021-01-01 RX ADMIN — MEMANTINE HYDROCHLORIDE 10 MG: 10 TABLET, FILM COATED ORAL at 11:13

## 2021-01-01 RX ADMIN — SODIUM CHLORIDE 500 ML: 0.9 INJECTION, SOLUTION INTRAVENOUS at 11:49

## 2021-01-01 RX ADMIN — Medication 2 MCG/MIN: at 22:36

## 2021-01-01 RX ADMIN — FUROSEMIDE 40 MG: 40 TABLET ORAL at 08:56

## 2021-01-01 RX ADMIN — ATORVASTATIN CALCIUM 10 MG: 10 TABLET, FILM COATED ORAL at 11:12

## 2021-01-01 RX ADMIN — MEMANTINE HYDROCHLORIDE 10 MG: 10 TABLET, FILM COATED ORAL at 09:20

## 2021-01-01 RX ADMIN — ACETAMINOPHEN 650 MG: 325 TABLET ORAL at 03:44

## 2021-01-01 RX ADMIN — SODIUM CHLORIDE 1000 ML: 9 INJECTION, SOLUTION INTRAVENOUS at 11:10

## 2021-01-01 RX ADMIN — ASPIRIN 81 MG: 81 TABLET, DELAYED RELEASE ORAL at 10:51

## 2021-01-01 RX ADMIN — FAMOTIDINE 20 MG: 10 INJECTION INTRAVENOUS at 17:47

## 2021-01-01 RX ADMIN — ASPIRIN 81 MG: 81 TABLET, COATED ORAL at 08:40

## 2021-01-01 RX ADMIN — EPHEDRINE SULFATE 15 MG: 5 INJECTION INTRAVENOUS at 13:19

## 2021-01-01 RX ADMIN — FENTANYL CITRATE 100 MCG: 50 INJECTION, SOLUTION INTRAMUSCULAR; INTRAVENOUS at 13:10

## 2021-01-01 RX ADMIN — ESMOLOL HYDROCHLORIDE 20 MG: 100 INJECTION, SOLUTION INTRAVENOUS at 14:07

## 2021-01-01 RX ADMIN — FUROSEMIDE 10 MG/HR: 10 INJECTION, SOLUTION INTRAMUSCULAR; INTRAVENOUS at 21:14

## 2021-01-01 RX ADMIN — Medication 0.5 MG/HR: at 01:03

## 2021-01-01 RX ADMIN — DOCUSATE SODIUM AND SENNOSIDES 2 TABLET: 8.6; 5 TABLET, FILM COATED ORAL at 10:47

## 2021-01-01 RX ADMIN — Medication 20 MEQ: at 19:04

## 2021-01-01 RX ADMIN — CHLORHEXIDINE GLUCONATE 15 ML: 1.2 RINSE ORAL at 09:20

## 2021-01-01 RX ADMIN — ACETAMINOPHEN 650 MG: 325 TABLET ORAL at 17:58

## 2021-01-01 RX ADMIN — ATORVASTATIN CALCIUM 10 MG: 10 TABLET, FILM COATED ORAL at 22:27

## 2021-01-01 RX ADMIN — PANTOPRAZOLE SODIUM 40 MG: 40 TABLET, DELAYED RELEASE ORAL at 17:10

## 2021-01-01 RX ADMIN — HYDROMORPHONE HYDROCHLORIDE 0.2 MG: 1 INJECTION, SOLUTION INTRAMUSCULAR; INTRAVENOUS; SUBCUTANEOUS at 09:20

## 2021-01-01 RX ADMIN — EPHEDRINE SULFATE 10 MG: 5 INJECTION INTRAVENOUS at 13:28

## 2021-01-01 RX ADMIN — Medication 20 MEQ: at 16:20

## 2021-01-01 RX ADMIN — LIDOCAINE HYDROCHLORIDE 1 APPLICATION.: 40 SOLUTION TOPICAL at 13:11

## 2021-01-01 RX ADMIN — SODIUM CHLORIDE, PRESERVATIVE FREE 2 ML: 5 INJECTION INTRAVENOUS at 09:00

## 2021-01-01 RX ADMIN — DOCUSATE SODIUM AND SENNOSIDES 2 TABLET: 8.6; 5 TABLET, FILM COATED ORAL at 11:14

## 2021-01-01 RX ADMIN — LOSARTAN POTASSIUM 25 MG: 50 TABLET, FILM COATED ORAL at 11:14

## 2021-01-01 RX ADMIN — APIXABAN 2.5 MG: 2.5 TABLET, FILM COATED ORAL at 10:51

## 2021-01-01 RX ADMIN — CARVEDILOL 6.25 MG: 6.25 TABLET, FILM COATED ORAL at 18:08

## 2021-01-01 RX ADMIN — CARVEDILOL 25 MG: 12.5 TABLET, FILM COATED ORAL at 17:47

## 2021-01-01 SDOH — SOCIAL STABILITY: SOCIAL INSECURITY: RISK FACTORS: AGE

## 2021-01-01 SDOH — SOCIAL STABILITY: SOCIAL INSECURITY: RISK FACTORS: HEART DISEASE

## 2021-01-01 ASSESSMENT — COGNITIVE AND FUNCTIONAL STATUS - GENERAL
UNDERSTANDING 10 TO 15 MIN SPEECH: A LOT
BECAUSE OF A PHYSICAL, MENTAL, OR EMOTIONAL CONDITION, DO YOU HAVE DIFFICULTY DOING ERRANDS ALONE: NO
HELP NEEDED DRESSING REGULAR UPPER BODY CLOTHING: A LITTLE
DO YOU HAVE SERIOUS DIFFICULTY WALKING OR CLIMBING STAIRS: NO
BECAUSE OF A PHYSICAL, MENTAL, OR EMOTIONAL CONDITION, DO YOU HAVE DIFFICULTY DOING ERRANDS ALONE: YES
BASIC_MOBILITY_RAW_SCORE: 18
BASIC_MOBILITY_RAW_SCORE: 20
HELP NEEDED FOR TOILETING: A LITTLE
DO YOU HAVE SERIOUS DIFFICULTY WALKING OR CLIMBING STAIRS: YES
REMEMBERING 5 ERRANDS WITH NO LIST: UNABLE
ARE YOU BLIND OR DO YOU HAVE SERIOUS DIFFICULTY SEEING, EVEN WHEN WEARING GLASSES: NO
UNDERSTANDING 10 TO 15 MIN SPEECH: A LITTLE
DO YOU HAVE DIFFICULTY DRESSING OR BATHING: YES
DAILY_ACTIVITY_CONVERTED_SCORE: 40.22
TAKING CARE OF COMPLICATED TASKS: UNABLE
REMEMBERING WHERE THINGS ARE: UNABLE
BECAUSE OF A PHYSICAL, MENTAL, OR EMOTIONAL CONDITION, DO YOU HAVE DIFFICULTY DOING ERRANDS ALONE: NO
FOLLOWS FAMILIAR CONVERSATION: A LITTLE
REMEMBERING TO TAKE MEDICATION: UNABLE
APPLIED_COGNITIVE_CONVERTED_SCORE: 24.98
DO YOU HAVE DIFFICULTY DRESSING OR BATHING: NO
BECAUSE OF A PHYSICAL, MENTAL, OR EMOTIONAL CONDITION, DO YOU HAVE SERIOUS DIFFICULTY CONCENTRATING, REMEMBERING OR MAKING DECISIONS: YES
HELP NEEDED DRESSING REGULAR LOWER BODY CLOTHING: A LITTLE
DO YOU HAVE SERIOUS DIFFICULTY WALKING OR CLIMBING STAIRS: NO
HELP NEEDED FOR BATHING: A LITTLE
DAILY_ACTIVITY_CONVERTED_SCORE: 40.22
REMEMBERING TO TAKE MEDICATION: UNABLE
ARE YOU DEAF OR DO YOU HAVE SERIOUS DIFFICULTY  HEARING: NO
BASIC_MOBILITY_CONVERTED_SCORE: 43.99
BASIC_MOBILITY_RAW_SCORE: 18
HELP NEEDED FOR PERSONAL GROOMING: A LITTLE
FOLLOWS FAMILIAR CONVERSATION: A LITTLE
REMEMBERING WHERE THINGS ARE: UNABLE
HELP NEEDED DRESSING REGULAR UPPER BODY CLOTHING: A LITTLE
HELP NEEDED FOR BATHING: A LITTLE
DO YOU HAVE DIFFICULTY DRESSING OR BATHING: NO
HELP NEEDED FOR TOILETING: A LITTLE
ARE YOU BLIND OR DO YOU HAVE SERIOUS DIFFICULTY SEEING, EVEN WHEN WEARING GLASSES: YES
ARE YOU DEAF OR DO YOU HAVE SERIOUS DIFFICULTY  HEARING: NO
TAKING CARE OF COMPLICATED TASKS: UNABLE
BECAUSE OF A PHYSICAL, MENTAL, OR EMOTIONAL CONDITION, DO YOU HAVE SERIOUS DIFFICULTY CONCENTRATING, REMEMBERING OR MAKING DECISIONS: NO
REMEMBERING 5 ERRANDS WITH NO LIST: UNABLE
APPLIED_COGNITIVE_RAW_SCORE: 10
ARE YOU DEAF OR DO YOU HAVE SERIOUS DIFFICULTY  HEARING: NO
BASIC_MOBILITY_RAW_SCORE: 18
BASIC_MOBILITY_CONVERTED_SCORE: 41.05
HELP NEEDED DRESSING REGULAR LOWER BODY CLOTHING: A LOT
BECAUSE OF A PHYSICAL, MENTAL, OR EMOTIONAL CONDITION, DO YOU HAVE SERIOUS DIFFICULTY CONCENTRATING, REMEMBERING OR MAKING DECISIONS: NO
BASIC_MOBILITY_CONVERTED_SCORE: 41.05
DAILY_ACTIVITY_RAW_SCORE: 19
DAILY_ACTIVITY_RAW_SCORE: 19
APPLIED_COGNITIVE_CONVERTED_SCORE: 22.48
APPLIED_COGNITIVE_RAW_SCORE: 9
BASIC_MOBILITY_CONVERTED_SCORE: 41.05

## 2021-01-01 ASSESSMENT — ACTIVITIES OF DAILY LIVING (ADL)
BATHING: INDEPENDENT
RECENT_DECLINE_ADL: YES, DECLINE IN AMBULATION/TRANSFERRING, COLLABORATE WITH PROVIDER (T)
HOME_MANAGEMENT_TIME_ENTRY: 23
CONTINENCE: INDEPENDENT
TOILETING: NEEDS ASSISTANCE
ADL_BEFORE_ADMISSION: INDEPENDENT
TRANSFERRING: NEEDS ASSISTANCE
RECENT_DECLINE_ADL: NO
ADL_BEFORE_ADMISSION: NEEDS/REQUIRES ASSISTANCE
PRIOR_ADL: INDEPENDENT
ADL_SCORE: 24
ADL_SHORT_OF_BREATH: NO
FEEDING YOURSELF: INDEPENDENT
TRANSFERRING: INDEPENDENT
TOILETING: INDEPENDENT
BATHING: NEEDS ASSISTANCE
DRESSING YOURSELF: NEEDS ASSISTANCE
PRIOR_ADL: MODIFIED INDEPENDENT
DRESSING YOURSELF: INDEPENDENT
CONTINENCE: INDEPENDENT
ADL_SCORE: 7
CONTINENCE: NEEDS ASSISTANCE
FEEDING YOURSELF: INDEPENDENT
DRESSING YOURSELF: INDEPENDENT
CHRONIC_PAIN_PRESENT: NO
ADL_SHORT_OF_BREATH: NO
BATHING: INDEPENDENT
TOILETING: INDEPENDENT
ADL_BEFORE_ADMISSION: INDEPENDENT
ADL_SCORE: 24
ADL_SHORT_OF_BREATH: NO
RECENT_DECLINE_ADL: NO
CHRONIC_PAIN_PRESENT: NO
FEEDING YOURSELF: INDEPENDENT
TRANSFERRING: INDEPENDENT
MOBILITY_ASSIST_DEVICES: STANDARD WALKER

## 2021-01-01 ASSESSMENT — PAIN SCALES - PAIN ASSESSMENT IN ADVANCED DEMENTIA (PAINAD)
BODYLANGUAGE: TENSE, DISTRESSED, FIDGETING
FACIALEXPRESSION: SMILING OR INEXPRESSIVE
NEGVOCALIZATION: REPEATED TROUBLED CALLING OUT. LOUD MOANING OR GROANING. CRYING
TOTALSCORE: 6
TOTALSCORE: 0
TOTALSCORE: 3
CONSOLABILITY: DISTRACTED OR REASSURED BY VOICE OR TOUCH
TOTALSCORE: 1
FACIALEXPRESSION: SMILING OR INEXPRESSIVE
BODYLANGUAGE: RELAXED
TOTALSCORE: 1
BREATHING: NORMAL
FACIALEXPRESSION: SAD. FRIGHTENED. FROWNING
NEGVOCALIZATION: OCCASIONAL MOAN OR GROAN, LOW LEVELS OF SPEECH WITH A NEGATIVE OR DISAPPROVING QUALITY
TOTALSCORE: 0
BODYLANGUAGE: RELAXED
FACIALEXPRESSION: SMILING OR INEXPRESSIVE
FACIALEXPRESSION: SMILING OR INEXPRESSIVE
CONSOLABILITY: NO NEED TO CONSOLE
BREATHING: NORMAL
BREATHING: NORMAL
CONSOLABILITY: DISTRACTED OR REASSURED BY VOICE OR TOUCH
CONSOLABILITY: NO NEED TO CONSOLE
BODYLANGUAGE: RELAXED
CONSOLABILITY: NO NEED TO CONSOLE
CONSOLABILITY: NO NEED TO CONSOLE
TOTALSCORE: 0
FACIALEXPRESSION: FACIAL GRIMACING
BREATHING: NORMAL
BODYLANGUAGE: RELAXED
BODYLANGUAGE: TENSE, DISTRESSED, FIDGETING
BREATHING: NORMAL
BODYLANGUAGE: RELAXED
NEGVOCALIZATION: OCCASIONAL MOAN OR GROAN, LOW LEVELS OF SPEECH WITH A NEGATIVE OR DISAPPROVING QUALITY
CONSOLABILITY: NO NEED TO CONSOLE
FACIALEXPRESSION: SMILING OR INEXPRESSIVE

## 2021-01-01 ASSESSMENT — ENCOUNTER SYMPTOMS
WEAKNESS: 1
BACK PAIN: 1
ACTIVITY CHANGE: 1
WEAKNESS: 1
ACTIVITY CHANGE: 1
APPETITE CHANGE: 1
APPETITE CHANGE: 1
PAIN SEVERITY NOW: 3
APPETITE CHANGE: 1
RHINORRHEA: 0
ACTIVITY CHANGE: 1
PAIN SEVERITY NOW: 0
WEAKNESS: 1
APPETITE CHANGE: 1
ACTIVITY CHANGE: 1
NAUSEA: 0
WEAKNESS: 1
SHORTNESS OF BREATH: 0
FATIGUE: 0
FEVER: 0
APPETITE CHANGE: 1
VOMITING: 0
EYE PAIN: 0
ACTIVITY CHANGE: 1
WEAKNESS: 1
WOUND: 0
WEAKNESS: 1
ABDOMINAL PAIN: 0
CHILLS: 0
APPETITE CHANGE: 1
CHEST TIGHTNESS: 0
ACTIVITY CHANGE: 1

## 2021-01-01 ASSESSMENT — PAIN SCALES - GENERAL
PAINLEVEL_OUTOF10: 0
PAINLEVEL_OUTOF10: 2
PAINLEVEL_OUTOF10: 6
PAINLEVEL_OUTOF10: 9
PAINLEVEL_OUTOF10: 0
PAINLEVEL_OUTOF10: 6
PAINLEVEL_OUTOF10: 0
PAINLEVEL_OUTOF10: 8
PAINLEVEL_OUTOF10: 2
PAINLEVEL_OUTOF10: 0

## 2021-01-01 ASSESSMENT — PAIN SCALES - WONG BAKER
WONGBAKER_NUMERICALRESPONSE: 5
WONGBAKER_NUMERICALRESPONSE: 4
WONGBAKER_NUMERICALRESPONSE: 1
WONGBAKER_NUMERICALRESPONSE: 1

## 2021-01-01 ASSESSMENT — LIFESTYLE VARIABLES
HOW MANY STANDARD DRINKS CONTAINING ALCOHOL DO YOU HAVE ON A TYPICAL DAY: 0,1 OR 2
HOW OFTEN DO YOU HAVE A DRINK CONTAINING ALCOHOL: NEVER
AUDIT-C TOTAL SCORE: 0
ALCOHOL_USE_STATUS: NO OR LOW RISK WITH VALIDATED TOOL
HOW OFTEN DO YOU HAVE 6 OR MORE DRINKS ON ONE OCCASION: NEVER
HOW OFTEN DO YOU HAVE 6 OR MORE DRINKS ON ONE OCCASION: NEVER
ALCOHOL_USE_STATUS: NO OR LOW RISK WITH VALIDATED TOOL
ALCOHOL_USE_STATUS: NO OR LOW RISK WITH VALIDATED TOOL
HOW OFTEN DO YOU HAVE A DRINK CONTAINING ALCOHOL: NEVER
HOW MANY STANDARD DRINKS CONTAINING ALCOHOL DO YOU HAVE ON A TYPICAL DAY: 0,1 OR 2
HOW OFTEN DO YOU HAVE A DRINK CONTAINING ALCOHOL: NEVER
AUDIT-C TOTAL SCORE: 0
HOW MANY STANDARD DRINKS CONTAINING ALCOHOL DO YOU HAVE ON A TYPICAL DAY: 0,1 OR 2

## 2021-05-07 PROBLEM — N18.2 CHRONIC KIDNEY DISEASE (CKD), STAGE II (MILD): Status: ACTIVE | Noted: 2021-01-01

## 2021-05-07 PROBLEM — Z98.890 HISTORY OF ORTHOPEDIC SURGERY: Status: ACTIVE | Noted: 2021-01-01

## 2021-05-07 PROBLEM — F03.90 DEMENTIA (CMD): Status: ACTIVE | Noted: 2021-01-01

## 2021-05-10 PROBLEM — R53.81 DEBILITY: Status: ACTIVE | Noted: 2021-01-01

## 2021-05-10 PROBLEM — R52 PAIN: Status: ACTIVE | Noted: 2020-07-13
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
## 2021-09-29 LAB — MYOGLOBIN UR-MCNC: <1 MG/L (ref 0–1)

## 2021-10-02 LAB
BACTERIA BLD CULT: NORMAL
BACTERIA BLD CULT: NORMAL

## (undated) DEVICE — Device

## (undated) DEVICE — IMPLANTABLE DEVICE
Type: IMPLANTABLE DEVICE | Site: HIP | Status: NON-FUNCTIONAL
Removed: 2021-05-03

## (undated) DEVICE — LAWSON - GOWN SURG STD XL STL DISP

## (undated) DEVICE — LAWSON - DRAPE UTILITY SPLIT 120X77IN